# Patient Record
Sex: FEMALE | NOT HISPANIC OR LATINO | Employment: FULL TIME
[De-identification: names, ages, dates, MRNs, and addresses within clinical notes are randomized per-mention and may not be internally consistent; named-entity substitution may affect disease eponyms.]

---

## 2017-05-03 PROBLEM — E66.01 MORBID OBESITY DUE TO EXCESS CALORIES (H): Status: ACTIVE | Noted: 2017-05-03

## 2017-11-12 ENCOUNTER — HEALTH MAINTENANCE LETTER (OUTPATIENT)
Age: 42
End: 2017-11-12

## 2018-08-17 ENCOUNTER — DOCUMENTATION ONLY (OUTPATIENT)
Dept: LAB | Facility: CLINIC | Age: 43
End: 2018-08-17

## 2018-08-17 DIAGNOSIS — Z13.29 SCREENING FOR THYROID DISORDER: ICD-10-CM

## 2018-08-17 DIAGNOSIS — Z13.1 SCREENING FOR DIABETES MELLITUS (DM): Primary | ICD-10-CM

## 2018-08-17 DIAGNOSIS — Z13.6 CARDIOVASCULAR SCREENING; LDL GOAL LESS THAN 160: ICD-10-CM

## 2018-08-17 NOTE — PROGRESS NOTES
Labs pended. Routed to PCP to review and order.    Sweetie Plaza RN,   [facility]

## 2018-08-21 ENCOUNTER — OFFICE VISIT (OUTPATIENT)
Dept: PEDIATRICS | Facility: CLINIC | Age: 43
End: 2018-08-21
Payer: COMMERCIAL

## 2018-08-21 ENCOUNTER — ALLIED HEALTH/NURSE VISIT (OUTPATIENT)
Dept: CARDIOLOGY | Facility: CLINIC | Age: 43
End: 2018-08-21
Attending: INTERNAL MEDICINE
Payer: COMMERCIAL

## 2018-08-21 VITALS
HEART RATE: 73 BPM | DIASTOLIC BLOOD PRESSURE: 74 MMHG | TEMPERATURE: 97 F | SYSTOLIC BLOOD PRESSURE: 110 MMHG | BODY MASS INDEX: 30.24 KG/M2 | OXYGEN SATURATION: 100 % | HEIGHT: 59 IN | WEIGHT: 150 LBS

## 2018-08-21 DIAGNOSIS — R00.2 PALPITATIONS: ICD-10-CM

## 2018-08-21 DIAGNOSIS — Z13.29 SCREENING FOR THYROID DISORDER: ICD-10-CM

## 2018-08-21 DIAGNOSIS — Z13.1 SCREENING FOR DIABETES MELLITUS (DM): ICD-10-CM

## 2018-08-21 DIAGNOSIS — Z13.6 CARDIOVASCULAR SCREENING; LDL GOAL LESS THAN 160: ICD-10-CM

## 2018-08-21 DIAGNOSIS — Z12.4 SCREENING FOR MALIGNANT NEOPLASM OF CERVIX: ICD-10-CM

## 2018-08-21 DIAGNOSIS — Z90.49 S/P CHOLECYSTECTOMY: ICD-10-CM

## 2018-08-21 DIAGNOSIS — R73.03 PREDIABETES: ICD-10-CM

## 2018-08-21 DIAGNOSIS — R79.89 ABNORMAL TSH: ICD-10-CM

## 2018-08-21 DIAGNOSIS — E66.09 CLASS 1 OBESITY DUE TO EXCESS CALORIES WITHOUT SERIOUS COMORBIDITY WITH BODY MASS INDEX (BMI) OF 30.0 TO 30.9 IN ADULT: ICD-10-CM

## 2018-08-21 DIAGNOSIS — B35.1 FUNGAL TOENAIL INFECTION: ICD-10-CM

## 2018-08-21 DIAGNOSIS — Z00.00 ROUTINE GENERAL MEDICAL EXAMINATION AT A HEALTH CARE FACILITY: Primary | ICD-10-CM

## 2018-08-21 DIAGNOSIS — E66.811 CLASS 1 OBESITY DUE TO EXCESS CALORIES WITHOUT SERIOUS COMORBIDITY WITH BODY MASS INDEX (BMI) OF 30.0 TO 30.9 IN ADULT: ICD-10-CM

## 2018-08-21 LAB
ALBUMIN SERPL-MCNC: 3.8 G/DL (ref 3.4–5)
ALP SERPL-CCNC: 83 U/L (ref 40–150)
ALT SERPL W P-5'-P-CCNC: 20 U/L (ref 0–50)
ANION GAP SERPL CALCULATED.3IONS-SCNC: 7 MMOL/L (ref 3–14)
AST SERPL W P-5'-P-CCNC: 8 U/L (ref 0–45)
BILIRUB DIRECT SERPL-MCNC: <0.1 MG/DL (ref 0–0.2)
BILIRUB SERPL-MCNC: 0.4 MG/DL (ref 0.2–1.3)
BUN SERPL-MCNC: 16 MG/DL (ref 7–30)
CALCIUM SERPL-MCNC: 8.6 MG/DL (ref 8.5–10.1)
CHLORIDE SERPL-SCNC: 107 MMOL/L (ref 94–109)
CHOLEST SERPL-MCNC: 161 MG/DL
CO2 SERPL-SCNC: 27 MMOL/L (ref 20–32)
CREAT SERPL-MCNC: 0.91 MG/DL (ref 0.52–1.04)
GFR SERPL CREATININE-BSD FRML MDRD: 67 ML/MIN/1.7M2
GLUCOSE SERPL-MCNC: 103 MG/DL (ref 70–99)
HBA1C MFR BLD: 5.7 % (ref 0–5.6)
HDLC SERPL-MCNC: 39 MG/DL
LDLC SERPL CALC-MCNC: 98 MG/DL
NONHDLC SERPL-MCNC: 122 MG/DL
POTASSIUM SERPL-SCNC: 4.2 MMOL/L (ref 3.4–5.3)
PROT SERPL-MCNC: 7.5 G/DL (ref 6.8–8.8)
SODIUM SERPL-SCNC: 141 MMOL/L (ref 133–144)
T4 FREE SERPL-MCNC: 1 NG/DL (ref 0.76–1.46)
TRIGL SERPL-MCNC: 119 MG/DL
TSH SERPL DL<=0.005 MIU/L-ACNC: 4.8 MU/L (ref 0.4–4)

## 2018-08-21 PROCEDURE — 87624 HPV HI-RISK TYP POOLED RSLT: CPT | Performed by: INTERNAL MEDICINE

## 2018-08-21 PROCEDURE — 84443 ASSAY THYROID STIM HORMONE: CPT | Performed by: FAMILY MEDICINE

## 2018-08-21 PROCEDURE — 83036 HEMOGLOBIN GLYCOSYLATED A1C: CPT | Performed by: FAMILY MEDICINE

## 2018-08-21 PROCEDURE — 0298T ZZC EXT ECG > 48HR TO 21 DAY REVIEW AND INTERPRETATN: CPT

## 2018-08-21 PROCEDURE — 84439 ASSAY OF FREE THYROXINE: CPT | Performed by: FAMILY MEDICINE

## 2018-08-21 PROCEDURE — 0296T ZIO PATCH HOLTER: CPT

## 2018-08-21 PROCEDURE — 36415 COLL VENOUS BLD VENIPUNCTURE: CPT | Performed by: INTERNAL MEDICINE

## 2018-08-21 PROCEDURE — 80061 LIPID PANEL: CPT | Performed by: FAMILY MEDICINE

## 2018-08-21 PROCEDURE — G0145 SCR C/V CYTO,THINLAYER,RESCR: HCPCS | Performed by: INTERNAL MEDICINE

## 2018-08-21 PROCEDURE — 99214 OFFICE O/P EST MOD 30 MIN: CPT | Mod: 25 | Performed by: INTERNAL MEDICINE

## 2018-08-21 PROCEDURE — 80048 BASIC METABOLIC PNL TOTAL CA: CPT | Performed by: FAMILY MEDICINE

## 2018-08-21 PROCEDURE — 93000 ELECTROCARDIOGRAM COMPLETE: CPT | Performed by: INTERNAL MEDICINE

## 2018-08-21 PROCEDURE — 80076 HEPATIC FUNCTION PANEL: CPT | Performed by: INTERNAL MEDICINE

## 2018-08-21 PROCEDURE — 99396 PREV VISIT EST AGE 40-64: CPT | Performed by: INTERNAL MEDICINE

## 2018-08-21 RX ORDER — TERBINAFINE HYDROCHLORIDE 250 MG/1
250 TABLET ORAL DAILY
Qty: 90 TABLET | Refills: 0 | Status: SHIPPED | OUTPATIENT
Start: 2018-08-21 | End: 2019-05-23

## 2018-08-21 NOTE — PROGRESS NOTES
Dear Yael,   Here are your recent results that we reviewed at your recent clinic visit. Please continue care plan during the visit and follow up as planned.     Please call or Mychart if you have further questions.     Diego Rouse MD-PhD

## 2018-08-21 NOTE — PROGRESS NOTES
SUBJECTIVE:   CC: Yael Morales is an 42 year old woman who presents for preventive health visit.     Healthy Habits:    Do you get at least three servings of calcium containing foods daily (dairy, green leafy vegetables, etc.)? no    Amount of exercise or daily activities, outside of work: 0 day(s) per week    Problems taking medications regularly No    Medication side effects: No    Have you had an eye exam in the past two years? yes    Do you see a dentist twice per year? yes    Do you have sleep apnea, excessive snoring or daytime drowsiness?yes      PROBLEMS TO ADD ON...   1. Upper abdominal discomfort, unrelated to eating. Happened 3 times. One time it was while sitting at work. Two other times at home. Didn't remember what she was doing. It is a 1/10. Not affecting activities but aware. No constipation or diarrhea. She had cholecystectomy 2 years ago. The surgeon told her she has an umbilical hernia.     Wt Readings from Last 5 Encounters:   08/21/18 150 lb (68 kg)   11/16/16 154 lb 3.2 oz (69.9 kg)   05/13/16 149 lb 4.8 oz (67.7 kg)   12/15/15 145 lb 4.5 oz (65.9 kg)   11/27/15 141 lb (64 kg)     2. Palpitations past 3 weeks. Her BP has been normal. But she feels flutters in the chest. Has never felt this before. Denies anxiety. Last a few seconds. Takes a big breath, relax and it goes away.     3.  Reports fungal toenail and desired treatment.  2 toenails also has darker coloration compared to the rest.  Denies trauma.  There is no pain or itching.  She does not do pedicure.    Today's PHQ-2 Score:   PHQ-2 ( 1999 [employer]) 8/21/2018 5/13/2016   Q1: Little interest or pleasure in doing things 0 0   Q2: Feeling down, depressed or hopeless 0 0   PHQ-2 Score 0 0       Abuse: Current or Past(Physical, Sexual or Emotional)- No  Do you feel safe in your environment - Yes    Social History   Substance Use Topics     Smoking status: Never Smoker     Smokeless tobacco: Never Used     Alcohol use No     If you drink  "alcohol do you typically have >3 drinks per day or >7 drinks per week? No                     Reviewed orders with patient.  Reviewed health maintenance and updated orders accordingly - Yes  Labs reviewed in EPIC    Mammogram not appropriate for this patient based on age.    Pertinent mammograms are reviewed under the imaging tab.  History of abnormal Pap smear: NO - age 30-65 PAP every 5 years with negative HPV co-testing recommended  PAP / HPV 9/12/2014 1/31/2014 7/11/2011   PAP NIL NIL NIL     Reviewed and updated as needed this visit by clinical staff  Tobacco  Allergies  Meds  Med Hx  Surg Hx  Fam Hx  Soc Hx        Reviewed and updated as needed this visit by Provider            ROS:  CONSTITUTIONAL: NEGATIVE for fever, chills, change in weight  INTEGUMENTARU/SKIN: NEGATIVE for worrisome rashes, moles or lesions  EYES: NEGATIVE for vision changes or irritation  ENT: NEGATIVE for ear, mouth and throat problems  RESP: NEGATIVE for significant cough or SOB  BREAST: NEGATIVE for masses, tenderness or discharge  CV: NEGATIVE for chest pain, palpitations or peripheral edema  GI: NEGATIVE for nausea, abdominal pain, heartburn, or change in bowel habits  : NEGATIVE for unusual urinary or vaginal symptoms. Periods are regular.  MUSCULOSKELETAL: NEGATIVE for significant arthralgias or myalgia  NEURO: NEGATIVE for weakness, dizziness or paresthesias  PSYCHIATRIC: NEGATIVE for changes in mood or affect    OBJECTIVE:   /74  Pulse 73  Temp 97  F (36.1  C) (Temporal)  Ht 4' 11.25\" (1.505 m)  Wt 150 lb (68 kg)  SpO2 100%  BMI 30.04 kg/m2  EXAM:  GENERAL: healthy, alert and no distress  EYES: Eyes grossly normal to inspection, PERRL and conjunctivae and sclerae normal  HENT: ear canals and TM's normal, nose and mouth without ulcers or lesions  NECK: no adenopathy, no asymmetry, masses, or scars and thyroid normal to palpation  RESP: lungs clear to auscultation - no rales, rhonchi or wheezes  BREAST: normal " without masses, tenderness or nipple discharge and no palpable axillary masses or adenopathy  CV: regular rate and rhythm, normal S1 S2, no S3 or S4, no murmur, click or rub, no peripheral edema and peripheral pulses strong  ABDOMEN: soft, nontender, no hepatosplenomegaly, no masses and bowel sounds normal  MS: no gross musculoskeletal defects noted, no edema  SKIN: no suspicious lesions or rashes  NEURO: Normal strength and tone, mentation intact and speech normal  PSYCH: mentation appears normal, affect normal/bright    Foot exam: Big toenail with yellowish discoloration and thickening in the distal third of the nail on both feet.  The third toe of both feet, the nail is slightly purplish in color.  At the base of the nailbed there is a reading of Dr. discoloration.  There is no tenderness or swelling or erythema.    Diagnostic Test Results:  Results for orders placed or performed in visit on 08/21/18 (from the past 24 hour(s))   Hepatic panel (Albumin, ALT, AST, Bili, Alk Phos, TP)   Result Value Ref Range    Bilirubin Direct <0.1 0.0 - 0.2 mg/dL    Bilirubin Total 0.4 0.2 - 1.3 mg/dL    Albumin 3.8 3.4 - 5.0 g/dL    Protein Total 7.5 6.8 - 8.8 g/dL    Alkaline Phosphatase 83 40 - 150 U/L    ALT 20 0 - 50 U/L    AST 8 0 - 45 U/L       Orders Only on 08/21/2018   Component Date Value Ref Range Status     Hemoglobin A1C 08/21/2018 5.7* 0 - 5.6 % Final    Comment: Normal <5.7% Prediabetes 5.7-6.4%  Diabetes 6.5% or higher - adopted from ADA   consensus guidelines.       Cholesterol 08/21/2018 161  <200 mg/dL Final     Triglycerides 08/21/2018 119  <150 mg/dL Final    Fasting specimen     HDL Cholesterol 08/21/2018 39* >49 mg/dL Final     LDL Cholesterol Calculated 08/21/2018 98  <100 mg/dL Final    Desirable:       <100 mg/dl     Non HDL Cholesterol 08/21/2018 122  <130 mg/dL Final     Sodium 08/21/2018 141  133 - 144 mmol/L Final     Potassium 08/21/2018 4.2  3.4 - 5.3 mmol/L Final     Chloride 08/21/2018 107  94 -  109 mmol/L Final     Carbon Dioxide 08/21/2018 27  20 - 32 mmol/L Final     Anion Gap 08/21/2018 7  3 - 14 mmol/L Final     Glucose 08/21/2018 103* 70 - 99 mg/dL Final    Fasting specimen     Urea Nitrogen 08/21/2018 16  7 - 30 mg/dL Final     Creatinine 08/21/2018 0.91  0.52 - 1.04 mg/dL Final     GFR Estimate 08/21/2018 67  >60 mL/min/1.7m2 Final    Non  GFR Calc     GFR Estimate If Black 08/21/2018 81  >60 mL/min/1.7m2 Final    African American GFR Calc     Calcium 08/21/2018 8.6  8.5 - 10.1 mg/dL Final     TSH 08/21/2018 4.80* 0.40 - 4.00 mU/L Final     T4 Free 08/21/2018 1.00  0.76 - 1.46 ng/dL Final        EKG: Sinus rhythm, no PVC or PAC.    ASSESSMENT/PLAN:       ICD-10-CM    1. Routine general medical examination at a health care facility Z00.00    2. Palpitations R00.2 EKG 12-lead complete w/read - Clinics     Zio Patch Holter   3. S/P cholecystectomy Z90.49    4. Screening for thyroid disorder Z13.29    5. Screening for malignant neoplasm of cervix Z12.4 Pap imaged thin layer screen with HPV - recommended age 30 - 65 years (select HPV order below)     HPV High Risk Types DNA Cervical   6. Abnormal TSH R94.6 TSH     T4, free     T3, Free     Thyroid peroxidase antibody   7. Prediabetes R73.03    8. Fungal toenail infection B35.1 Hepatic panel (Albumin, ALT, AST, Bili, Alk Phos, TP)     terbinafine (LAMISIL) 250 MG tablet     Hepatic panel (Albumin, ALT, AST, Bili, Alk Phos, TP)   9. Class 1 obesity due to excess calories without serious comorbidity with body mass index (BMI) of 30.0 to 30.9 in adult E66.09     Z68.30        --Reported history of umbilical hernia/mild abdominal discomfort: This is not reproducible on exam.  I advised the patient to correlate the pain in the location of the pain when he comes next.  Consider follow-up with her general surgeon regarding the diagnosis umbilical hernia.  --Abnormal TSH: Very borderline finding.  I recommended recheck in a month with  "thyroperoxidase antibody.  --Fungal toenail in the big toes: Baseline hepatic function is normal.  We will start with Lamisil.  Patient will recheck liver function tests with the thyroid tests in a month.  Complete total treatment for 3 months.  I discussed with the patient that there is about 60% chance of relapse.  If this does not help with the dystrophic nail, consider seeing podiatry for evaluation.  --Prediabetes/low HDL: Discussed diet exercise and weight loss.  --Palpitation: ECG did not have evidence of PVC or PAC.  Will have patient do a Zio patch for evaluation.    COUNSELING:   Reviewed preventive health counseling, as reflected in patient instructions    BP Readings from Last 1 Encounters:   08/21/18 110/74     Estimated body mass index is 30.04 kg/(m^2) as calculated from the following:    Height as of this encounter: 4' 11.25\" (1.505 m).    Weight as of this encounter: 150 lb (68 kg).      Weight management plan: Encouraged patient to work on diet and weight loss.     reports that she has never smoked. She has never used smokeless tobacco.      Counseling Resources:  ATP IV Guidelines  Pooled Cohorts Equation Calculator  Breast Cancer Risk Calculator  FRAX Risk Assessment  ICSI Preventive Guidelines  Dietary Guidelines for Americans, 2010  USDA's MyPlate  ASA Prophylaxis  Lung CA Screening    Diego Rouse MD PhD  [facility]  "

## 2018-08-21 NOTE — LETTER
September 21, 2018      Yael Morales  [address] [address]        Dear Yael,     Your recent lab results showed the following:  -- Holter monitor result didn't show any arrhythmia. During the time you had the monitor, no symptoms were reported. So we cannot say for certain what the palpitations are.   -- The good news is that your heart rhythm throughout the course is normal. If the palpitations becomes more frequent in the future, such as daily, we can consider re-do the monitor again.     Please call or Mychart to our office if you have further questions.     Diego Rouse MD-PhD

## 2018-08-21 NOTE — PROGRESS NOTES
Patient has been prescribed a ZioPatch holter for 14 days.  Patient was instructed regarding the indication, function, care and prompt return of the ZioPatch holter monitor. The monitor, with S/N [id number],  was placed on the patient with instructions regarding care of the skin, electrodes, and monitor, as well as documentation in the patient diary. Patient demonstrated understanding of this information and [facility] further questions or [facility].

## 2018-08-21 NOTE — PROGRESS NOTES
Dear Yael,   Here are your recent results which are within the expected range.  Please continue with your current plan of care.     Please call or Mychart to our office if you have further questions.     Diego Rouse MD-PhD

## 2018-08-21 NOTE — MR AVS SNAPSHOT
After Visit Summary   8/21/2018    Yael Morales    MRN: [id number]           Patient Information     Date Of Birth          1975        Visit Information        Provider Department      8/21/2018 8:30 AM Diego Rouse MD PhD [facility]        Today's Diagnoses     Routine general medical examination at a health care facility    -  1    Palpitations        S/P cholecystectomy        Screening for thyroid disorder        Screening for malignant neoplasm of cervix        Abnormal TSH        Prediabetes        Fungal toenail infection          Care Instructions    Make appointment(s) for:   -- Zio patch   -- non fasting lab in 1 month       Medication(s) prescribed today:    Orders Placed This Encounter   Medications     terbinafine (LAMISIL) 250 MG tablet     Sig: Take 1 tablet (250 mg) by mouth daily     Dispense:  90 tablet     Refill:  0             Preventive Health Recommendations  Female Ages 40 to 49    Yearly exam:     See your health care provider every year in order to  1. Review health changes.   2. Discuss preventive care.    3. Review your medicines if your doctor prescribed any.      Get a Pap test every three years (unless you have an abnormal result and your provider advises testing more often).      If you get Pap tests with HPV test, you only need to test every 5 years, unless you have an abnormal result. You do not need a Pap test if your uterus was removed (hysterectomy) and you have not had cancer.      You should be tested each year for STDs (sexually transmitted diseases), if you're at risk.     Ask your doctor if you should have a mammogram.      Have a colonoscopy (test for colon cancer) if someone in your family has had colon cancer or polyps before age 50.       Have a cholesterol test every 5 years.       Have a diabetes test (fasting glucose) after age 45. If you are at risk for diabetes, you should have this test every 3 years.    Shots: Get a flu shot  each year. Get a tetanus shot every 10 years.     Nutrition:     Eat at least 5 servings of fruits and vegetables each day.    Eat whole-grain bread, whole-wheat pasta and brown rice instead of white grains and rice.    Get adequate Calcium and Vitamin D.      Lifestyle    Exercise at least 150 minutes a week (an average of 30 minutes a day, 5 days a week). This will help you control your weight and prevent disease.    Limit alcohol to one drink per day.    No smoking.     Wear sunscreen to prevent skin cancer.    See your dentist every six months for an exam and cleaning.          Follow-ups after your visit        Future tests that were ordered for you today     Open Future Orders        Priority Expected Expires Ordered    Hepatic panel (Albumin, ALT, AST, Bili, Alk Phos, TP) Routine  8/21/2019 8/21/2018    Zio Patch Holter Routine  10/5/2018 8/21/2018    TSH Routine  10/31/2018 8/21/2018    T4, free Routine  10/31/2018 8/21/2018    T3, Free Routine  10/31/2018 8/21/2018    Thyroid peroxidase antibody Routine  10/31/2018 8/21/2018            Who to contact     If you have questions or need follow up information about today's clinic visit or your schedule please contact [facility] directly at [phone].  Normal or non-critical lab and imaging results will be communicated to you by Vyyknhart, letter or phone within 4 business days after the clinic has received the results. If you do not hear from us within 7 days, please contact the clinic through Vyyknhart or phone. If you have a critical or abnormal lab result, we will notify you by phone as soon as possible.  Submit refill requests through Secure-NOK or call your pharmacy and they will forward the refill request to us. Please allow 3 business days for your refill to be completed.          Additional Information About Your Visit        VyyknharFetchnotes Information     Secure-NOK gives you secure access to your electronic health record. If you see a primary care  "provider, you can also send messages to your care team and make appointments. If you have questions, please call your primary care clinic.  If you do not have a primary care provider, please call [phone] and they will assist you.      AFINOS is an electronic gateway that provides easy, online access to your medical records. With AFINOS, you can request a clinic appointment, read your test results, renew a prescription or communicate with your care team.     To access your existing account, please contact your [facility] or call [phone] for assistance.        Care EveryWhere ID     This is your Care EveryWhere ID. This could be used by other organizations to access your [place] medical records  [id number]        Your Vitals Were     Pulse Temperature Height Pulse Oximetry BMI (Body Mass Index)       73 97  F (36.1  C) (Temporal) 4' 11.25\" (1.505 m) 100% 30.04 kg/m2        Blood Pressure from Last 3 Encounters:   08/21/18 110/74   11/16/16 132/70   05/13/16 102/70    Weight from Last 3 Encounters:   08/21/18 150 lb (68 kg)   11/16/16 154 lb 3.2 oz (69.9 kg)   05/13/16 149 lb 4.8 oz (67.7 kg)              We Performed the Following     EKG 12-lead complete w/read - Clinics     Hepatic panel (Albumin, ALT, AST, Bili, Alk Phos, TP)     HPV High Risk Types DNA Cervical     Pap imaged thin layer screen with HPV - recommended age 30 - 65 years (select HPV order below)          Today's Medication Changes          These changes are accurate as of 8/21/18  9:29 AM.  If you have any questions, ask your nurse or doctor.               Start taking these medicines.        Dose/Directions    terbinafine 250 MG tablet   Commonly known as:  lamISIL   Used for:  Fungal toenail infection   Started by:  Diego Rouse MD PhD        Dose:  250 mg   Take 1 tablet (250 mg) by mouth daily   Quantity:  90 tablet   Refills:  0            Where to get your medicines      These medications were sent to " [id number] - [place] - [address] [facility]  [address] [address]     Phone:  [phone]     terbinafine 250 MG tablet                Primary Care Provider Office Phone # Fax #    Diego Rouse MD PhD [phone]       [address]  [facility] [address]        Equal Access to Services     MIRTHA DUNCAN : Hadii aad ku hadasho Soomaali, waaxda luqadaha, qaybta kaalmada adeegyada, waxay idiin hayaan adeeg kharash la'josé luis . So [facility] [phone].    ATENCIÓN: Si habla español, tiene a posadas disposición servicios gratuitos de asistencia lingüística. Fe al [phone].    We comply with applicable federal civil rights laws and [place] laws. We do not discriminate on the basis of race, color, national origin, age, disability, sex, sexual orientation, or gender identity.            Thank you!     Thank you for choosing [facility]  for your care. Our goal is always to provide you with excellent care. Hearing back from our patients is one way we can continue to improve our services. Please take a few minutes to complete the written survey that you may receive in the mail after your visit with us. Thank you!             Your Updated Medication List - Protect others around you: Learn how to safely use, store and throw away your medicines at www.disposemymeds.org.          This list is accurate as of 8/21/18  9:29 AM.  Always use your most recent med list.                   Brand Name Dispense Instructions for use Diagnosis    terbinafine 250 MG tablet    lamISIL    90 tablet    Take 1 tablet (250 mg) by mouth daily    Fungal toenail infection

## 2018-08-21 NOTE — PATIENT INSTRUCTIONS
Make appointment(s) for:   -- Zio patch   -- non fasting lab in 1 month       Medication(s) prescribed today:    Orders Placed This Encounter   Medications     terbinafine (LAMISIL) 250 MG tablet     Sig: Take 1 tablet (250 mg) by mouth daily     Dispense:  90 tablet     Refill:  0             Preventive Health Recommendations  Female Ages 40 to 49    Yearly exam:     See your health care provider every year in order to  1. Review health changes.   2. Discuss preventive care.    3. Review your medicines if your doctor prescribed any.      Get a Pap test every three years (unless you have an abnormal result and your provider advises testing more often).      If you get Pap tests with HPV test, you only need to test every 5 years, unless you have an abnormal result. You do not need a Pap test if your uterus was removed (hysterectomy) and you have not had cancer.      You should be tested each year for STDs (sexually transmitted diseases), if you're at risk.     Ask your doctor if you should have a mammogram.      Have a colonoscopy (test for colon cancer) if someone in your family has had colon cancer or polyps before age 50.       Have a cholesterol test every 5 years.       Have a diabetes test (fasting glucose) after age 45. If you are at risk for diabetes, you should have this test every 3 years.    Shots: Get a flu shot each year. Get a tetanus shot every 10 years.     Nutrition:     Eat at least 5 servings of fruits and vegetables each day.    Eat whole-grain bread, whole-wheat pasta and brown rice instead of white grains and rice.    Get adequate Calcium and Vitamin D.      Lifestyle    Exercise at least 150 minutes a week (an average of 30 minutes a day, 5 days a week). This will help you control your weight and prevent disease.    Limit alcohol to one drink per day.    No smoking.     Wear sunscreen to prevent skin cancer.    See your dentist every six months for an exam and cleaning.

## 2018-08-21 NOTE — MR AVS SNAPSHOT
MRN:[id number]                      After Visit Summary   8/21/2018    Yael Morales    MRN: [id number]           Visit Information        Provider Department      8/21/2018 9:45 AM MG CV TECH; MG DEVICE [facility]        Your next 10 appointments already scheduled     Sep 21, 2018 10:20 AM CDT   LAB with LAB FIRST FLOOR [facility]    [address] [place] [id number] [phone]           Please do not eat 10-12 hours before your appointment if you are coming in fasting for labs on lipids, cholesterol, or glucose (sugar). This does not apply to pregnant women. Water, hot tea and black coffee (with nothing added) are okay. Do not drink other fluids, diet soda or chew gum.              CogniFit Information     CogniFit gives you secure access to your electronic health record. If you see a primary care provider, you can also send messages to your care team and make appointments. If you have questions, please call your primary care clinic.  If you do not have a primary care provider, please call [phone] and they will assist you.      CogniFit is an electronic gateway that provides easy, online access to your medical records. With CogniFit, you can request a clinic appointment, read your test results, renew a prescription or communicate with your care team.     To access your existing account, please contact your [facility] or call [phone] for assistance.        Care EveryWhere ID     This is your Care EveryWhere ID. This could be used by other organizations to access your [place] medical records  [id number]        Equal Access to Services     SARA DUNCAN : Hadwale macdonald Somiriam, waaxda luqadaha, qaybta kaalmada zac connors idijose pena. So [facility] [phone].    ATENCIÓN: Si habla español, tiene a posadas disposición servicios gratuitos de  asistencia lingüística. Fe al [phone].    We comply with applicable federal civil rights laws and [place] laws. We do not discriminate on the basis of race, color, national origin, age, disability, sex, sexual orientation, or gender identity.

## 2018-08-24 LAB
COPATH REPORT: NORMAL
PAP: NORMAL

## 2018-08-28 LAB
FINAL DIAGNOSIS: NORMAL
HPV HR 12 DNA CVX QL NAA+PROBE: NEGATIVE
HPV16 DNA SPEC QL NAA+PROBE: NEGATIVE
HPV18 DNA SPEC QL NAA+PROBE: NEGATIVE
SPECIMEN DESCRIPTION: NORMAL
SPECIMEN SOURCE CVX/VAG CYTO: NORMAL

## 2018-11-19 DIAGNOSIS — R79.89 ABNORMAL TSH: ICD-10-CM

## 2018-11-19 DIAGNOSIS — B35.1 FUNGAL TOENAIL INFECTION: ICD-10-CM

## 2018-11-19 LAB
ALBUMIN SERPL-MCNC: 3.6 G/DL (ref 3.4–5)
ALP SERPL-CCNC: 72 U/L (ref 40–150)
ALT SERPL W P-5'-P-CCNC: 18 U/L (ref 0–50)
AST SERPL W P-5'-P-CCNC: 9 U/L (ref 0–45)
BILIRUB DIRECT SERPL-MCNC: 0.1 MG/DL (ref 0–0.2)
BILIRUB SERPL-MCNC: 0.5 MG/DL (ref 0.2–1.3)
PROT SERPL-MCNC: 6.8 G/DL (ref 6.8–8.8)
T3FREE SERPL-MCNC: 2.1 PG/ML (ref 2.3–4.2)
T4 FREE SERPL-MCNC: 0.93 NG/DL (ref 0.76–1.46)
TSH SERPL DL<=0.005 MIU/L-ACNC: 1.8 MU/L (ref 0.4–4)

## 2018-11-19 PROCEDURE — 84443 ASSAY THYROID STIM HORMONE: CPT | Performed by: INTERNAL MEDICINE

## 2018-11-19 PROCEDURE — 84439 ASSAY OF FREE THYROXINE: CPT | Performed by: INTERNAL MEDICINE

## 2018-11-19 PROCEDURE — 36415 COLL VENOUS BLD VENIPUNCTURE: CPT | Performed by: INTERNAL MEDICINE

## 2018-11-19 PROCEDURE — 80076 HEPATIC FUNCTION PANEL: CPT | Performed by: INTERNAL MEDICINE

## 2018-11-19 PROCEDURE — 86376 MICROSOMAL ANTIBODY EACH: CPT | Performed by: INTERNAL MEDICINE

## 2018-11-19 PROCEDURE — 84481 FREE ASSAY (FT-3): CPT | Performed by: INTERNAL MEDICINE

## 2018-11-20 LAB — THYROPEROXIDASE AB SERPL-ACNC: <10 IU/ML

## 2018-12-07 NOTE — PROGRESS NOTES
Dear Yael,   Your recent lab results showed the following:  -- your labs were all normal except for slightly low free T3. This usually reflects missing doses. Thyroid hormone replacement level is good based on normal TSH.  -- no change in your current medications.     Please call or Mychart to our office if you have further questions.     Diego Rouse MD-PhD

## 2019-05-23 ENCOUNTER — OFFICE VISIT (OUTPATIENT)
Dept: PEDIATRICS | Facility: CLINIC | Age: 44
End: 2019-05-23
Payer: COMMERCIAL

## 2019-05-23 VITALS
BODY MASS INDEX: 28.32 KG/M2 | SYSTOLIC BLOOD PRESSURE: 110 MMHG | WEIGHT: 140.5 LBS | HEIGHT: 59 IN | OXYGEN SATURATION: 100 % | TEMPERATURE: 98.5 F | HEART RATE: 68 BPM | DIASTOLIC BLOOD PRESSURE: 74 MMHG

## 2019-05-23 DIAGNOSIS — N30.01 ACUTE CYSTITIS WITH HEMATURIA: Primary | ICD-10-CM

## 2019-05-23 DIAGNOSIS — N91.2 ABSENCE OF MENSTRUATION: ICD-10-CM

## 2019-05-23 LAB
ALBUMIN UR-MCNC: 30 MG/DL
APPEARANCE UR: ABNORMAL
BILIRUB UR QL STRIP: NEGATIVE
COLOR UR AUTO: YELLOW
GLUCOSE UR STRIP-MCNC: NEGATIVE MG/DL
HCG UR QL: NEGATIVE
HGB UR QL STRIP: ABNORMAL
KETONES UR STRIP-MCNC: NEGATIVE MG/DL
LEUKOCYTE ESTERASE UR QL STRIP: ABNORMAL
NITRATE UR QL: NEGATIVE
PH UR STRIP: 8 PH (ref 5–7)
RBC #/AREA URNS AUTO: ABNORMAL /HPF
RENAL EPI CELLS #/AREA URNS HPF: ABNORMAL /HPF
SOURCE: ABNORMAL
SP GR UR STRIP: 1.01 (ref 1–1.03)
UROBILINOGEN UR STRIP-MCNC: NORMAL MG/DL (ref 0–2)
WBC #/AREA URNS AUTO: ABNORMAL /HPF
WBC CLUMPS #/AREA URNS HPF: PRESENT /HPF

## 2019-05-23 PROCEDURE — 87088 URINE BACTERIA CULTURE: CPT | Performed by: FAMILY MEDICINE

## 2019-05-23 PROCEDURE — 87086 URINE CULTURE/COLONY COUNT: CPT | Performed by: FAMILY MEDICINE

## 2019-05-23 PROCEDURE — 81001 URINALYSIS AUTO W/SCOPE: CPT | Performed by: FAMILY MEDICINE

## 2019-05-23 PROCEDURE — 81025 URINE PREGNANCY TEST: CPT | Performed by: FAMILY MEDICINE

## 2019-05-23 PROCEDURE — 87186 SC STD MICRODIL/AGAR DIL: CPT | Performed by: FAMILY MEDICINE

## 2019-05-23 PROCEDURE — 99213 OFFICE O/P EST LOW 20 MIN: CPT | Performed by: FAMILY MEDICINE

## 2019-05-23 RX ORDER — SULFAMETHOXAZOLE/TRIMETHOPRIM 800-160 MG
1 TABLET ORAL 2 TIMES DAILY
Qty: 10 TABLET | Refills: 0 | Status: SHIPPED | OUTPATIENT
Start: 2019-05-23 | End: 2019-05-28

## 2019-05-23 ASSESSMENT — PAIN SCALES - GENERAL: PAINLEVEL: MODERATE PAIN (4)

## 2019-05-23 ASSESSMENT — MIFFLIN-ST. JEOR: SCORE: 1196.89

## 2019-05-23 NOTE — PATIENT INSTRUCTIONS
"Patient Education     Bladder Infection, Female (Adult)    Urine is normally doesn't have any bacteria in it. But bacteria can get into the urinary tract from the skin around the rectum. Or they can travel in the blood from elsewhere in the body. Once they are in your urinary tract, they can cause infection in the urethra (urethritis), the bladder (cystitis), or the kidneys (pyelonephritis).  The most common place for an infection is in the bladder. This is called a bladder infection. This is one of the most common infections in women. Most bladder infections are easily treated. They are not serious unless the infection spreads to the kidney.  The phrases \"bladder infection,\" \"UTI,\" and \"cystitis\" are often used to describe the same thing. But they are not always the same. Cystitis is an inflammation of the bladder. The most common cause of cystitis is an infection.  Symptoms  The infection causes inflammation in the urethra and bladder. This causes many of the symptoms. The most common symptoms of a bladder infection are:    Pain or burning when urinating    Having to urinate more often than usual    Urgent need to urinate    Only a small amount of urine comes out    Blood in urine    Abdominal discomfort. This is usually in the lower abdomen above the pubic bone.    Cloudy urine    Strong- or bad-smelling urine    Unable to urinate (urinary retention)    Unable to hold urine in (urinary incontinence)    Fever    Loss of appetite    Confusion (in older adults)  Causes  Bladder infections are not contagious. You can't get one from someone else, from a toilet seat, or from sharing a bath.  The most common cause of bladder infections is bacteria from the bowels. The bacteria get onto the skin around the opening of the urethra. From there, they can get into the urine and travel up to the bladder, causing inflammation and infection. This usually happens because of:    Wiping improperly after urinating. Always wipe from " front to back.    Bowel incontinence    Pregnancy    Procedures such as having a catheter inserted    Older age    Not emptying your bladder. This can allow bacteria a chance to grow in your urine.    Dehydration    Constipation    Sex    Use of a diaphragm for birth control   Treatment  Bladder infections are diagnosed by a urine test. They are treated with antibiotics and usually clear up quickly without complications. Treatment helps prevent a more serious kidney infection.  Medicines  Medicines can help in the treatment of a bladder infection:    Take antibiotics until they are used up, even if you feel better. It is important to finish them to make sure the infection has cleared.    You can use acetaminophen or ibuprofen for pain, fever, or discomfort, unless another medicine was prescribed. If you have chronic liver or kidney disease, talk with your healthcare provider before using these medicines. Also talk with your provider if you've ever had a stomach ulcer or gastrointestinal bleeding, or are taking blood-thinner medicines.    If you are given phenazopydridine to reduce burning with urination, it will cause your urine to become a bright orange color. This can stain clothing.  Care and prevention  These self-care steps can help prevent future infections:    Drink plenty of fluids to prevent dehydration and flush out your bladder. Do this unless you must restrict fluids for other health reasons, or your doctor told you not to.    Proper cleaning after going to the bathroom is important. Wipe from front to back after using the toilet to prevent the spread of bacteria.    Urinate more often. Don't try to hold urine in for a long time.    Wear loose-fitting clothes and cotton underwear. Avoid tight-fitting pants.    Improve your diet and prevent constipation. Eat more fresh fruit and vegetables, and fiber, and less junk and fatty foods.    Avoid sex until your symptoms are gone.    Avoid caffeine, alcohol, and  spicy foods. These can irritate your bladder.    Urinate right after intercourse to flush out your bladder.    If you use birth control pills and have frequent bladder infections, discuss it with your doctor.  Follow-up care  Call your healthcare provider if all symptoms are not gone after 3 days of treatment. This is especially important if you have repeat infections.  If a culture was done, you will be told if your treatment needs to be changed. If directed, you can call to find out the results.  If X-rays were done, you will be told if the results will affect your treatment.  Call 911  Call 911 if any of the following occur:    Trouble breathing    Hard to wake up or confusion    Fainting or loss of consciousness    Rapid heart rate  When to seek medical advice  Call your healthcare provider right away if any of these occur:    Fever of 100.4 F (38.0 C) or higher, or as directed by your healthcare provider    Symptoms are not better by the third day of treatment    Back or belly (abdominal) pain that gets worse    Repeated vomiting, or unable to keep medicine down    Weakness or dizziness    Vaginal discharge    Pain, redness, or swelling in the outer vaginal area (labia)  Date Last Reviewed: 10/1/2016    4183-0354 [employer]. [address] [place] [address], [place] [address]. All rights reserved. This information is not intended as a substitute for professional medical care. Always follow your healthcare professional's instructions.

## 2019-05-23 NOTE — PROGRESS NOTES
"Subjective     Yael ALINE Morales is a 43 year old female who presents to clinic today for the following health issues:    HPI   URINARY TRACT SYMPTOMS      Duration: Monday    Description  dysuria, frequency, urgency and hematuria    Intensity:  moderate, severe    Accompanying signs and symptoms:  Fever/chills: Cold sweats this morning  Flank pain no   Nausea and vomiting: YES- nausea  Vaginal symptoms: none  Abdominal/Pelvic Pain: YES    History  History of frequent UTI's: no   History of kidney stones: no   Sexually Active: YES  Possibility of pregnancy: No    Precipitating or alleviating factors: None    Therapies tried and outcome: increase fluid intake           Reviewed and updated as needed this visit by Provider         Review of Systems   ROS COMP: Constitutional, HEENT, cardiovascular, pulmonary, gi and gu systems are negative, except as otherwise noted.      Objective    /74 (BP Location: Right arm, Patient Position: Sitting, Cuff Size: Adult Regular)   Pulse 68   Temp 98.5  F (36.9  C) (Oral)   Ht 1.505 m (4' 11.25\")   Wt 63.7 kg (140 lb 8 oz)   LMP 05/08/2019 (Exact Date)   SpO2 100%   BMI 28.14 kg/m    Body mass index is 28.14 kg/m .  Physical Exam   GENERAL: healthy, alert and no distress  RESP: lungs clear to auscultation - no rales, rhonchi or wheezes  CV: regular rate and rhythm, normal S1 S2, no S3 or S4, no murmur, click or rub, no peripheral edema and peripheral pulses strong  ABDOMEN: soft, nontender, no hepatosplenomegaly, no masses and bowel sounds normal            Assessment & Plan     1. Acute cystitis with hematuria  Push fluids, await urine culture.  - sulfamethoxazole-trimethoprim (BACTRIM DS/SEPTRA DS) 800-160 MG tablet; Take 1 tablet by mouth 2 times daily for 5 days  Dispense: 10 tablet; Refill: 0    2. Absence of menstruation  Reviewed negative pregnancy test with patient. Proceed with treatment for cystitis.  - Urine Culture Aerobic Bacterial  - HCG qualitative urine   "     Return if symptoms worsen or fail to improve.    Deisy Coyne MD  [facility]

## 2019-05-26 LAB
BACTERIA SPEC CULT: ABNORMAL
SPECIMEN SOURCE: ABNORMAL

## 2019-05-28 ENCOUNTER — TELEPHONE (OUTPATIENT)
Dept: PEDIATRICS | Facility: CLINIC | Age: 44
End: 2019-05-28

## 2019-05-28 DIAGNOSIS — N30.00 ACUTE CYSTITIS WITHOUT HEMATURIA: Primary | ICD-10-CM

## 2019-05-28 RX ORDER — CEFDINIR 300 MG/1
300 CAPSULE ORAL 2 TIMES DAILY
Qty: 14 CAPSULE | Refills: 0 | Status: SHIPPED | OUTPATIENT
Start: 2019-05-28 | End: 2019-06-04

## 2019-05-28 NOTE — TELEPHONE ENCOUNTER
I called to notify patient of urine culture and need to switch antibiotics. Cefdinir 300 mg twice daily for 7 days sent to the [facility] pharmacy at [facility].

## 2019-12-03 ENCOUNTER — OFFICE VISIT (OUTPATIENT)
Dept: PEDIATRICS | Facility: CLINIC | Age: 44
End: 2019-12-03
Payer: COMMERCIAL

## 2019-12-03 VITALS
HEART RATE: 95 BPM | WEIGHT: 142 LBS | HEIGHT: 59 IN | OXYGEN SATURATION: 99 % | TEMPERATURE: 97.2 F | DIASTOLIC BLOOD PRESSURE: 70 MMHG | SYSTOLIC BLOOD PRESSURE: 113 MMHG | BODY MASS INDEX: 28.63 KG/M2

## 2019-12-03 DIAGNOSIS — E78.5 HYPERLIPIDEMIA LDL GOAL <160: ICD-10-CM

## 2019-12-03 DIAGNOSIS — Z00.00 ROUTINE GENERAL MEDICAL EXAMINATION AT A HEALTH CARE FACILITY: Primary | ICD-10-CM

## 2019-12-03 DIAGNOSIS — Z23 FLU VACCINE NEED: ICD-10-CM

## 2019-12-03 DIAGNOSIS — E66.3 OVERWEIGHT (BMI 25.0-29.9): ICD-10-CM

## 2019-12-03 DIAGNOSIS — Z12.31 ENCOUNTER FOR SCREENING MAMMOGRAM FOR BREAST CANCER: ICD-10-CM

## 2019-12-03 PROBLEM — E66.01 MORBID OBESITY DUE TO EXCESS CALORIES (H): Status: RESOLVED | Noted: 2017-05-03 | Resolved: 2019-12-03

## 2019-12-03 LAB
ANION GAP SERPL CALCULATED.3IONS-SCNC: 3 MMOL/L (ref 3–14)
BUN SERPL-MCNC: 13 MG/DL (ref 7–30)
CALCIUM SERPL-MCNC: 8.7 MG/DL (ref 8.5–10.1)
CHLORIDE SERPL-SCNC: 110 MMOL/L (ref 94–109)
CHOLEST SERPL-MCNC: 179 MG/DL
CO2 SERPL-SCNC: 28 MMOL/L (ref 20–32)
CREAT SERPL-MCNC: 0.66 MG/DL (ref 0.52–1.04)
GFR SERPL CREATININE-BSD FRML MDRD: >90 ML/MIN/{1.73_M2}
GLUCOSE SERPL-MCNC: 95 MG/DL (ref 70–99)
HDLC SERPL-MCNC: 47 MG/DL
LDLC SERPL CALC-MCNC: 114 MG/DL
NONHDLC SERPL-MCNC: 132 MG/DL
POTASSIUM SERPL-SCNC: 4 MMOL/L (ref 3.4–5.3)
SODIUM SERPL-SCNC: 141 MMOL/L (ref 133–144)
TRIGL SERPL-MCNC: 89 MG/DL

## 2019-12-03 PROCEDURE — 80061 LIPID PANEL: CPT | Performed by: INTERNAL MEDICINE

## 2019-12-03 PROCEDURE — 99396 PREV VISIT EST AGE 40-64: CPT | Performed by: INTERNAL MEDICINE

## 2019-12-03 PROCEDURE — 80048 BASIC METABOLIC PNL TOTAL CA: CPT | Performed by: INTERNAL MEDICINE

## 2019-12-03 PROCEDURE — 36415 COLL VENOUS BLD VENIPUNCTURE: CPT | Performed by: INTERNAL MEDICINE

## 2019-12-03 ASSESSMENT — MIFFLIN-ST. JEOR: SCORE: 1195.77

## 2019-12-03 NOTE — RESULT ENCOUNTER NOTE
Results discussed directly with patient while patient was present. Any further details documented in the note.   Diego Rouse MD PhD

## 2019-12-03 NOTE — PATIENT INSTRUCTIONS
Make appointment(s) for:   -- mammogram.           Preventive Health Recommendations  Female Ages 40 to 49    Yearly exam:     See your health care provider every year in order to  1. Review health changes.   2. Discuss preventive care.    3. Review your medicines if your doctor prescribed any.      Get a Pap test every three years (unless you have an abnormal result and your provider advises testing more often).      If you get Pap tests with HPV test, you only need to test every 5 years, unless you have an abnormal result. You do not need a Pap test if your uterus was removed (hysterectomy) and you have not had cancer.      You should be tested each year for STDs (sexually transmitted diseases), if you're at risk.     Ask your doctor if you should have a mammogram.      Have a colonoscopy (test for colon cancer) if someone in your family has had colon cancer or polyps before age 50.       Have a cholesterol test every 5 years.       Have a diabetes test (fasting glucose) after age 45. If you are at risk for diabetes, you should have this test every 3 years.    Shots: Get a flu shot each year. Get a tetanus shot every 10 years.     Nutrition:     Eat at least 5 servings of fruits and vegetables each day.    Eat whole-grain bread, whole-wheat pasta and brown rice instead of white grains and rice.    Get adequate Calcium and Vitamin D.      Lifestyle    Exercise at least 150 minutes a week (an average of 30 minutes a day, 5 days a week). This will help you control your weight and prevent disease.    Limit alcohol to one drink per day.    No smoking.     Wear sunscreen to prevent skin cancer.    See your dentist every six months for an exam and cleaning.  Preventive Health Recommendations  Female Ages 40 to 49    Yearly exam:   See your health care provider every year in order to  Review health changes.   Discuss preventive care.    Review your medicines if your doctor prescribed any.    Get a Pap test every three  years (unless you have an abnormal result and your provider advises testing more often).    If you get Pap tests with HPV test, you only need to test every 5 years, unless you have an abnormal result. You do not need a Pap test if your uterus was removed (hysterectomy) and you have not had cancer.    You should be tested each year for STDs (sexually transmitted diseases), if you're at risk.   Ask your doctor if you should have a mammogram.    Have a colonoscopy (test for colon cancer) if someone in your family has had colon cancer or polyps before age 50.     Have a cholesterol test every 5 years.     Have a diabetes test (fasting glucose) after age 45. If you are at risk for diabetes, you should have this test every 3 years.    Shots: Get a flu shot each year. Get a tetanus shot every 10 years.     Nutrition:   Eat at least 5 servings of fruits and vegetables each day.  Eat whole-grain bread, whole-wheat pasta and brown rice instead of white grains and rice.  Get adequate Calcium and Vitamin D.      Lifestyle  Exercise at least 150 minutes a week (an average of 30 minutes a day, 5 days a week). This will help you control your weight and prevent disease.  Limit alcohol to one drink per day.  No smoking.   Wear sunscreen to prevent skin cancer.  See your dentist every six months for an exam and cleaning.

## 2020-03-01 ENCOUNTER — HEALTH MAINTENANCE LETTER (OUTPATIENT)
Age: 45
End: 2020-03-01

## 2020-07-24 ENCOUNTER — OFFICE VISIT (OUTPATIENT)
Dept: PEDIATRICS | Facility: CLINIC | Age: 45
End: 2020-07-24
Payer: COMMERCIAL

## 2020-07-24 ENCOUNTER — ANCILLARY PROCEDURE (OUTPATIENT)
Dept: GENERAL RADIOLOGY | Facility: CLINIC | Age: 45
End: 2020-07-24
Attending: FAMILY MEDICINE
Payer: COMMERCIAL

## 2020-07-24 VITALS
RESPIRATION RATE: 16 BRPM | WEIGHT: 141.2 LBS | BODY MASS INDEX: 28.47 KG/M2 | HEIGHT: 59 IN | SYSTOLIC BLOOD PRESSURE: 112 MMHG | OXYGEN SATURATION: 98 % | TEMPERATURE: 97.2 F | HEART RATE: 78 BPM | DIASTOLIC BLOOD PRESSURE: 72 MMHG

## 2020-07-24 DIAGNOSIS — M25.562 ACUTE PAIN OF LEFT KNEE: Primary | ICD-10-CM

## 2020-07-24 PROCEDURE — 73562 X-RAY EXAM OF KNEE 3: CPT | Mod: LT | Performed by: RADIOLOGY

## 2020-07-24 PROCEDURE — 99213 OFFICE O/P EST LOW 20 MIN: CPT | Performed by: FAMILY MEDICINE

## 2020-07-24 RX ORDER — NAPROXEN 500 MG/1
500 TABLET ORAL 2 TIMES DAILY PRN
Qty: 30 TABLET | Refills: 0 | Status: SHIPPED | OUTPATIENT
Start: 2020-07-24 | End: 2021-05-07

## 2020-07-24 RX ORDER — METHYLPREDNISOLONE 4 MG
TABLET, DOSE PACK ORAL
Qty: 21 TABLET | Refills: 0 | Status: SHIPPED | OUTPATIENT
Start: 2020-07-24 | End: 2020-08-12

## 2020-07-24 ASSESSMENT — PAIN SCALES - GENERAL: PAINLEVEL: SEVERE PAIN (6)

## 2020-07-24 ASSESSMENT — MIFFLIN-ST. JEOR: SCORE: 1185.72

## 2020-07-24 NOTE — PATIENT INSTRUCTIONS
Patient Education     Understanding Meniscal Tears    The meniscus is a tough cushion of fibrous tissue called cartilage in the knee joint. It cushions the knee. It absorbs shock and helps spread weight across the knee joint. It also works with other parts of the knee to help keep the joint stable. Injury or aging can cause the meniscus to tear and lead to pain and problems using the knee.   What causes meniscal tears?  A sudden injury can tear the meniscus. This is often because of planting the foot and twisting the knee. Sports such as soccer, football, and basketball are often involved. Repeated actions, such as squatting, may also lead to a tear. Breakdown of the meniscus because of aging can also lead to tears.  Symptoms of meniscal tears  These can include:    Knee pain    Knee swelling    Catching of the knee or inability to straighten the knee    Unstable feeling in the knee  Treatment for meniscal tears  A tear is unlikely to heal on its own. You often will need surgery to repair a tear. In many cases, your healthcare provider will first try treatments to help relieve symptoms. These may include:    Rest the knee. This means avoiding any activity that puts stress on the knee joint. These include kneeling, squatting, jogging, and climbing stairs. In some cases, you may need to use crutches for a time to keep body weight off of the knee joint.    Cold pack. Putting a cold pack on the knee helps reduce pain and swelling.    Knee brace. Bracing the knee helps support it.    Medicine. Prescription and over-the-counter pain medicines can help relieve swelling and pain.    Exercises. Exercises help strengthen the muscles of the leg to help support the knee joint.  If these treatments don t help relieve symptoms or the injury is severe, you may need surgery. This can repair the meniscus to relieve symptoms and restore movement.     When to call your healthcare provider  Call your healthcare provider right away if  you have any of these:    Fever of 100.4 F (38 C) or higher, or as directed    Pain or swelling that gets worse, including pain in the calf    Numbness or tingling in leg or foot    You suddenly can t put any weight on your leg    Your knee  locks    Date Last Reviewed: 3/10/2016    4912-2057 [employer]. [address] [place] [address]. All rights reserved. This information is not intended as a substitute for professional medical care. Always follow your healthcare professional's instructions.         Rest the affected painful area as much as possible.  Apply ice for 15-20 minutes intermittently as needed and especially after any offending activity. Daily stretching.

## 2020-07-24 NOTE — PROGRESS NOTES
Subjective     Yael Morales is a 44 year old female who presents to clinic today for the following health issues:    HPI       Musculoskeletal problem/pain      Duration: Five weeks ago    Description  Location: Lateral aspect of left knee    Intensity:  moderate, 6/10    Accompanying signs and symptoms: swelling in the left knee    History  Previous similar problem: no   Previous evaluation:  none    Precipitating or alleviating factors:  Trauma or overuse: no   Aggravating factors include: walking, climbing stairs and lifting, flexion and extension    Therapies tried and outcome: nothing      Patient Active Problem List   Diagnosis     Hyperlipidemia LDL goal <160     H/O shoulder dystocia in prior pregnancy, currently pregnant     Gallstones     Need for Tdap vaccination     AMA (advanced maternal age) multigravida 35+     Gestational diabetes mellitus, antepartum     Abnormal maternal glucose tolerance, complicating pregnancy     Past Surgical History:   Procedure Laterality Date     LAPAROSCOPIC CHOLECYSTECTOMY N/A 12/15/2015    Procedure: LAPAROSCOPIC CHOLECYSTECTOMY;  Surgeon: Chasity Young MD;  Location: [facility] OR     NO HISTORY OF SURGERY         Social History     Tobacco Use     Smoking status: Never Smoker     Smokeless tobacco: Never Used   Substance Use Topics     Alcohol use: No     Family History   Problem Relation Age of Onset     C.A.D. Mother      Heart Disease Mother      Lipids Mother      Thyroid Disease Mother      Diabetes No family hx of      Asthma No family hx of      Hypertension No family hx of      Cerebrovascular Disease No family hx of      Breast Cancer No family hx of      Cancer - colorectal No family hx of      Prostate Cancer No family hx of      Coronary Artery Disease No family hx of      Colon Cancer No family hx of      Hyperlipidemia No family hx of      Other Cancer No family hx of      Depression No family hx of      Anxiety Disorder No family hx of      Mental  "Illness No family hx of      Substance Abuse No family hx of      Anesthesia Reaction No family hx of      Osteoporosis No family hx of      Genetic Disorder No family hx of          Current Outpatient Medications   Medication Sig Dispense Refill     methylPREDNISolone (MEDROL DOSEPAK) 4 MG tablet therapy pack Follow Package Directions 21 tablet 0     naproxen (NAPROSYN) 500 MG tablet Take 1 tablet (500 mg) by mouth 2 times daily as needed for moderate pain 30 tablet 0     order for DME Equipment being ordered: left knee brace 1 Units 0     No Known Allergies  Recent Labs   Lab Test 12/03/19  0905 11/19/18  1008 08/21/18  0809 11/16/16  1549  05/31/13  0756   A1C  --   --  5.7*  --   --   --    *  --  98  --   --  117   HDL 47*  --  39*  --   --  41*   TRIG 89  --  119  --   --  108   ALT  --  18 20 25   < >  --    CR 0.66  --  0.91 0.74   < >  --    GFRESTIMATED >90  --  67 85   < >  --    GFRESTBLACK >90  --  81 >90   GFR Calc     < >  --    POTASSIUM 4.0  --  4.2 3.8   < >  --    TSH  --  1.80 4.80* 2.31  --  5.02*    < > = values in this interval not displayed.      BP Readings from Last 3 Encounters:   07/24/20 112/72   12/03/19 113/70   05/23/19 110/74    Wt Readings from Last 3 Encounters:   07/24/20 64 kg (141 lb 3.2 oz)   12/03/19 64.4 kg (142 lb)   05/23/19 63.7 kg (140 lb 8 oz)                    Reviewed and updated as needed this visit by Provider  Tobacco  Allergies  Meds  Problems  Med Hx  Surg Hx  Fam Hx         Review of Systems   Constitutional, HEENT, cardiovascular, pulmonary, GI, , musculoskeletal, neuro, skin, endocrine and psych systems are negative, except as otherwise noted.      Objective    /72 (BP Location: Right arm, Patient Position: Sitting, Cuff Size: Adult Large)   Pulse 78   Temp 97.2  F (36.2  C) (Tympanic)   Resp 16   Ht 1.49 m (4' 10.66\")   Wt 64 kg (141 lb 3.2 oz)   SpO2 98%   BMI 28.85 kg/m    Body mass index is 28.85 " kg/m .  Physical Exam  Musculoskeletal:      Left knee: Tenderness found. Lateral joint line tenderness noted.        GENERAL: healthy, alert and no distress    Results for orders placed or performed in visit on 07/24/20   XR Knee Left 3 Views     Status: None    Narrative    3 views left knee radiographs 7/24/2020 3:31 PM    History: Acute pain of left knee    Additional history from EMR: Symptom for 5 weeks. No trauma.    Comparison: None available.    Findings:    Upright AP, lateral and patellofemoral views of the left knee were  obtained.     Incomplete vertical linear lucency at the lateral tibial plateau  centrally, only seen on the frontal projection, presumably artifactual  given lack of associated trauma history. Otherwise, no acute osseous  abnormality. Small joint effusion.    No substantial degenerative change.    No patellar tilt or lateral subluxation.  Soft tissue is unremarkable.      Impression    Impression:  1. Incomplete vertical linear lucency at the lateral tibial plateau  centrally, only seen on the frontal projection, presumably artifactual  given lack of associated trauma history. Otherwise, no acute osseous  abnormality.   2. No substantial degenerative change.    JESSICA CAMARENA         Assessment & Plan     1. Acute pain of left knee  Arthritis vs lateral meniscal injury  Rest the affected painful area as much as possible.  Apply ice for 15-20 minutes intermittently as needed and especially after any offending activity. Daily stretching.  As pain recedes, begin normal activities slowly as tolerated.  Consider Physical Therapy if symptoms not better with symptomatic care.    - order for DME; Equipment being ordered: left knee brace  Dispense: 1 Units; Refill: 0  - methylPREDNISolone (MEDROL DOSEPAK) 4 MG tablet therapy pack; Follow Package Directions  Dispense: 21 tablet; Refill: 0  - naproxen (NAPROSYN) 500 MG tablet; Take 1 tablet (500 mg) by mouth 2 times daily as needed for moderate  pain  Dispense: 30 tablet; Refill: 0  - XR Knee Left 3 Views         Return in about 3 weeks (around 8/14/2020).    Deisy Coyne MD  [facility]

## 2020-07-27 ENCOUNTER — TELEPHONE (OUTPATIENT)
Dept: PEDIATRICS | Facility: CLINIC | Age: 45
End: 2020-07-27

## 2020-07-27 NOTE — TELEPHONE ENCOUNTER
"Patient will call back to schedule if needed.  Patient states the medication is working and she can \"do the steps now\"    Julieth Mills  Primary Care   [facility] [place]    "

## 2020-08-12 ENCOUNTER — OFFICE VISIT (OUTPATIENT)
Dept: PEDIATRICS | Facility: CLINIC | Age: 45
End: 2020-08-12
Payer: COMMERCIAL

## 2020-08-12 VITALS
HEART RATE: 69 BPM | WEIGHT: 139.3 LBS | TEMPERATURE: 97.3 F | BODY MASS INDEX: 28.46 KG/M2 | DIASTOLIC BLOOD PRESSURE: 60 MMHG | OXYGEN SATURATION: 98 % | SYSTOLIC BLOOD PRESSURE: 110 MMHG

## 2020-08-12 DIAGNOSIS — M25.562 ACUTE PAIN OF LEFT KNEE: Primary | ICD-10-CM

## 2020-08-12 DIAGNOSIS — S83.282D TEAR OF LATERAL MENISCUS OF LEFT KNEE, CURRENT, UNSPECIFIED TEAR TYPE, SUBSEQUENT ENCOUNTER: ICD-10-CM

## 2020-08-12 PROCEDURE — 99213 OFFICE O/P EST LOW 20 MIN: CPT | Performed by: FAMILY MEDICINE

## 2020-08-12 NOTE — PROGRESS NOTES
Subjective     Yael Morales is a 44 year old female who presents to clinic today for the following health issues:    HPI       Musculoskeletal problem/pain  Patient was knee pain follow up, was seen for left knee pain with diagnosis of probably left meniscus injury. She was prescribed a brace and oral antiinflammatory and encouraged to follow up. Her knee felt much improved so didn't need a follow up but pain with walking up the step started again about a week ago, she denies trauma or new injuries.        Patient Active Problem List   Diagnosis     Hyperlipidemia LDL goal <160     H/O shoulder dystocia in prior pregnancy, currently pregnant     Gallstones     Need for Tdap vaccination     AMA (advanced maternal age) multigravida 35+     Gestational diabetes mellitus, antepartum     Abnormal maternal glucose tolerance, complicating pregnancy     Past Surgical History:   Procedure Laterality Date     LAPAROSCOPIC CHOLECYSTECTOMY N/A 12/15/2015    Procedure: LAPAROSCOPIC CHOLECYSTECTOMY;  Surgeon: Chasity Young MD;  Location: [facility]     NO HISTORY OF SURGERY         Social History     Tobacco Use     Smoking status: Never Smoker     Smokeless tobacco: Never Used   Substance Use Topics     Alcohol use: No     Family History   Problem Relation Age of Onset     C.A.D. Mother      Heart Disease Mother      Lipids Mother      Thyroid Disease Mother      Diabetes No family hx of      Asthma No family hx of      Hypertension No family hx of      Cerebrovascular Disease No family hx of      Breast Cancer No family hx of      Cancer - colorectal No family hx of      Prostate Cancer No family hx of      Coronary Artery Disease No family hx of      Colon Cancer No family hx of      Hyperlipidemia No family hx of      Other Cancer No family hx of      Depression No family hx of      Anxiety Disorder No family hx of      Mental Illness No family hx of      Substance Abuse No family hx of      Anesthesia Reaction No family  hx of      Osteoporosis No family hx of      Genetic Disorder No family hx of          Current Outpatient Medications   Medication Sig Dispense Refill     naproxen (NAPROSYN) 500 MG tablet Take 1 tablet (500 mg) by mouth 2 times daily as needed for moderate pain 30 tablet 0     order for DME Equipment being ordered: left knee brace 1 Units 0     No Known Allergies  Recent Labs   Lab Test 12/03/19  0905 11/19/18  1008 08/21/18  0809 11/16/16  1549  05/31/13  0756   A1C  --   --  5.7*  --   --   --    *  --  98  --   --  117   HDL 47*  --  39*  --   --  41*   TRIG 89  --  119  --   --  108   ALT  --  18 20 25   < >  --    CR 0.66  --  0.91 0.74   < >  --    GFRESTIMATED >90  --  67 85   < >  --    GFRESTBLACK >90  --  81 >90   GFR Calc     < >  --    POTASSIUM 4.0  --  4.2 3.8   < >  --    TSH  --  1.80 4.80* 2.31  --  5.02*    < > = values in this interval not displayed.      BP Readings from Last 3 Encounters:   08/12/20 110/60   07/24/20 112/72   12/03/19 113/70    Wt Readings from Last 3 Encounters:   08/12/20 63.2 kg (139 lb 4.8 oz)   07/24/20 64 kg (141 lb 3.2 oz)   12/03/19 64.4 kg (142 lb)                    Reviewed and updated as needed this visit by Provider  Med Hx  Surg Hx  Fam Hx         Review of Systems   Constitutional, HEENT, cardiovascular, pulmonary, GI, , musculoskeletal, neuro, skin, endocrine and psych systems are negative, except as otherwise noted.      Objective    /60   Pulse 69   Temp 97.3  F (36.3  C)   Wt 63.2 kg (139 lb 4.8 oz)   SpO2 98%   BMI 28.46 kg/m    Body mass index is 28.46 kg/m .  Physical Exam  Musculoskeletal:      Left knee: She exhibits bony tenderness. Tenderness found. LCL tenderness noted.        GENERAL: healthy, alert and no distress            Assessment & Plan     1. Acute pain of left knee  Rest the affected painful area as much as possible.  Apply ice for 15-20 minutes intermittently as needed and especially after any  offending activity. Daily stretching.      - PHYSICAL THERAPY REFERRAL; Future  - MR Knee Left w/o Contrast; Future    2. Tear of lateral meniscus of left knee, current, unspecified tear type, subsequent encounter  - MR Knee Left w/o Contrast; Future         No follow-ups on file.    Deisy Coyne MD  [facility]

## 2020-09-04 ENCOUNTER — ANCILLARY PROCEDURE (OUTPATIENT)
Dept: MRI IMAGING | Facility: CLINIC | Age: 45
End: 2020-09-04
Attending: FAMILY MEDICINE
Payer: COMMERCIAL

## 2020-09-04 DIAGNOSIS — S83.282D TEAR OF LATERAL MENISCUS OF LEFT KNEE, CURRENT, UNSPECIFIED TEAR TYPE, SUBSEQUENT ENCOUNTER: ICD-10-CM

## 2020-09-04 DIAGNOSIS — M25.562 ACUTE PAIN OF LEFT KNEE: ICD-10-CM

## 2020-09-04 PROCEDURE — 73721 MRI JNT OF LWR EXTRE W/O DYE: CPT | Mod: LT | Performed by: RADIOLOGY

## 2020-09-10 DIAGNOSIS — S83.282A TEAR OF LATERAL MENISCUS OF LEFT KNEE, CURRENT, UNSPECIFIED TEAR TYPE, INITIAL ENCOUNTER: Primary | ICD-10-CM

## 2020-09-18 NOTE — PROGRESS NOTES
SUBJECTIVE:   Yael Morales is a 44 year old female who is seen in consultation at the request of Dr. Coyne for evaluation of left knee pain. It has been approximately 3 months since the initial injury.   Mechanism: no injury. But pain started on steps, then became swollen    Present symptoms: a bit better now.  Pain with walking a lot at work,   Stairs    Pain extending knee, pain anteriorly, anterolateral    She was prescribed a brace and oral antiinflammatory     Review of Systems:  Constitutional:  NEGATIVE for fever, chills, change in weight  Integumentary/Skin:  NEGATIVE for worrisome rashes, moles or lesions  Eyes:  NEGATIVE for vision changes or irritation  ENT/Mouth:  NEGATIVE for ear, mouth and throat problems  Resp:  NEGATIVE for significant cough or SOB  Breast:  NEGATIVE for masses, tenderness or discharge  CV:  NEGATIVE for chest pain, palpitations or peripheral edema  GI:  NEGATIVE for nausea, abdominal pain, heartburn, or change in bowel habits  :  Negative   Musculoskeletal:  See HPI above  Neuro:  NEGATIVE for weakness, dizziness or paresthesias  Endocrine:  NEGATIVE for temperature intolerance, skin/hair changes  Heme/allergy/immune:  NEGATIVE for bleeding problems  Psychiatric:  NEGATIVE for changes in mood or affect    Past Medical History:   Past Medical History:   Diagnosis Date     Cholelithiases      Plantar warts 2011     Past Surgical History:   Past Surgical History:   Procedure Laterality Date     LAPAROSCOPIC CHOLECYSTECTOMY N/A 12/15/2015    Procedure: LAPAROSCOPIC CHOLECYSTECTOMY;  Surgeon: Chasity Young MD;  Location: [facility]     NO HISTORY OF SURGERY       Family History:   Family History   Problem Relation Age of Onset     C.A.D. Mother      Heart Disease Mother      Lipids Mother      Thyroid Disease Mother      Diabetes No family hx of      Asthma No family hx of      Hypertension No family hx of      Cerebrovascular Disease No family hx of      Breast Cancer No  family hx of      Cancer - colorectal No family hx of      Prostate Cancer No family hx of      Coronary Artery Disease No family hx of      Colon Cancer No family hx of      Hyperlipidemia No family hx of      Other Cancer No family hx of      Depression No family hx of      Anxiety Disorder No family hx of      Mental Illness No family hx of      Substance Abuse No family hx of      Anesthesia Reaction No family hx of      Osteoporosis No family hx of      Genetic Disorder No family hx of      Social History:   Social History     Tobacco Use     Smoking status: Never Smoker     Smokeless tobacco: Never Used   Substance Use Topics     Alcohol use: No       OBJECTIVE:  Physical Exam:  There were no vitals taken for this visit.  General Appearance: healthy, alert and no distress   Skin: no suspicious lesions or rashes  Neuro: Normal strength and tone, mentation intact and speech normal  Vascular: good pulses, and cappillary refill   Lymph: no lymphadenopathy   Psych:  mentation appears normal and affect normal/bright  Resp: no increased work of breathing     Left Knee Exam:  Gait: walks with normal gait  Alignment: normal   Squat: 100 % painfree.    Patellofemoral joint: moderate crepitations in the patellofemoral joint.  Extensor lag: none  Effusion: mild  ROM: full  Tender: non-tender   Masses: none, but slight fullness proximal to patella  Ligaments:   Lachman's: stable    Anterior Drawer: stable    Pivot Shift: neg   Posterior drawer: stable    Varus/Valgus stress: stable   McMurrays: negative    X-rays:  Obtained 7/24/20 of the left knee reviewed in the office with the patient today show :    1. Incomplete vertical linear lucency at the lateral tibial plateau  centrally, only seen on the frontal projection, presumably artifactual  given lack of associated trauma history. Otherwise, no acute osseous  abnormality.   2. No substantial degenerative change.       MRI:    From 9/4/20 of the left knee  showed:    Impression:  1. Grade 1 sprain of the tibial collateral ligament, posterior oblique  ligament and medial retinaculum.  2. Horizontal tear of the anterior horn of the lateral meniscus,  extending into the anterior root ligament, without a cyst.. Small joint effusion and  moderate Baker's cyst.  3. Small amount of edema deep to the iliotibial band, can be seen in  the setting of iliotibial band friction syndrome.  4. Grade IV chondromalacia patellofemoral compartment.      ASSESSMENT:   Encounter Diagnoses   Name Primary?     Other tear of lateral meniscus of left knee, unspecified whether old or current tear, initial encounter Yes     Patellofemoral arthritis of left knee    Baker's cyst-- asymptomatic     PLAN:   nsaids and physical therapy suggested  Corticosteroid injection a possibility in the future  Surgery a last resort since anterior horn tears notoriously don't respond well to surgical treatment.  Problems and options discussed in detail  Return to clinic: as needed     KENTON Bartholomew MD  Dept. Orthopedic Surgery  [facility]

## 2020-09-22 ENCOUNTER — OFFICE VISIT (OUTPATIENT)
Dept: ORTHOPEDICS | Facility: CLINIC | Age: 45
End: 2020-09-22
Attending: FAMILY MEDICINE
Payer: COMMERCIAL

## 2020-09-22 VITALS
BODY MASS INDEX: 28.02 KG/M2 | HEIGHT: 59 IN | SYSTOLIC BLOOD PRESSURE: 118 MMHG | WEIGHT: 139 LBS | OXYGEN SATURATION: 100 % | DIASTOLIC BLOOD PRESSURE: 74 MMHG | HEART RATE: 79 BPM

## 2020-09-22 DIAGNOSIS — M17.12 PATELLOFEMORAL ARTHRITIS OF LEFT KNEE: ICD-10-CM

## 2020-09-22 DIAGNOSIS — S83.282A TEAR OF LATERAL MENISCUS OF LEFT KNEE, CURRENT, UNSPECIFIED TEAR TYPE, INITIAL ENCOUNTER: ICD-10-CM

## 2020-09-22 DIAGNOSIS — S83.282A OTHER TEAR OF LATERAL MENISCUS OF LEFT KNEE, UNSPECIFIED WHETHER OLD OR CURRENT TEAR, INITIAL ENCOUNTER: Primary | ICD-10-CM

## 2020-09-22 PROCEDURE — 99243 OFF/OP CNSLTJ NEW/EST LOW 30: CPT | Performed by: ORTHOPAEDIC SURGERY

## 2020-09-22 ASSESSMENT — MIFFLIN-ST. JEOR: SCORE: 1181.13

## 2020-09-22 NOTE — LETTER
9/22/2020         RE: Yael Morales  [address]  [place] [address]        Dear Colleague,    Thank you for referring your patient, Yael Morales, to the [facility]. Please see a copy of my visit note below.    SUBJECTIVE:   Yael Morales is a 44 year old female who is seen in consultation at the request of Dr. Coyne for evaluation of left knee pain. It has been approximately 3 months since the initial injury.   Mechanism: no injury. But pain started on steps, then became swollen    Present symptoms: a bit better now.  Pain with walking a lot at work,   Stairs    Pain extending knee, pain anteriorly, anterolateral    She was prescribed a brace and oral antiinflammatory     Review of Systems:  Constitutional:  NEGATIVE for fever, chills, change in weight  Integumentary/Skin:  NEGATIVE for worrisome rashes, moles or lesions  Eyes:  NEGATIVE for vision changes or irritation  ENT/Mouth:  NEGATIVE for ear, mouth and throat problems  Resp:  NEGATIVE for significant cough or SOB  Breast:  NEGATIVE for masses, tenderness or discharge  CV:  NEGATIVE for chest pain, palpitations or peripheral edema  GI:  NEGATIVE for nausea, abdominal pain, heartburn, or change in bowel habits  :  Negative   Musculoskeletal:  See HPI above  Neuro:  NEGATIVE for weakness, dizziness or paresthesias  Endocrine:  NEGATIVE for temperature intolerance, skin/hair changes  Heme/allergy/immune:  NEGATIVE for bleeding problems  Psychiatric:  NEGATIVE for changes in mood or affect    Past Medical History:   Past Medical History:   Diagnosis Date     Cholelithiases      Plantar warts 2011     Past Surgical History:   Past Surgical History:   Procedure Laterality Date     LAPAROSCOPIC CHOLECYSTECTOMY N/A 12/15/2015    Procedure: LAPAROSCOPIC CHOLECYSTECTOMY;  Surgeon: Chasity Young MD;  Location:  OR     NO HISTORY OF SURGERY       Family History:   Family History   Problem Relation Age of Onset     C.A.D.  Mother      Heart Disease Mother      Lipids Mother      Thyroid Disease Mother      Diabetes No family hx of      Asthma No family hx of      Hypertension No family hx of      Cerebrovascular Disease No family hx of      Breast Cancer No family hx of      Cancer - colorectal No family hx of      Prostate Cancer No family hx of      Coronary Artery Disease No family hx of      Colon Cancer No family hx of      Hyperlipidemia No family hx of      Other Cancer No family hx of      Depression No family hx of      Anxiety Disorder No family hx of      Mental Illness No family hx of      Substance Abuse No family hx of      Anesthesia Reaction No family hx of      Osteoporosis No family hx of      Genetic Disorder No family hx of      Social History:   Social History     Tobacco Use     Smoking status: Never Smoker     Smokeless tobacco: Never Used   Substance Use Topics     Alcohol use: No       OBJECTIVE:  Physical Exam:  There were no vitals taken for this visit.  General Appearance: healthy, alert and no distress   Skin: no suspicious lesions or rashes  Neuro: Normal strength and tone, mentation intact and speech normal  Vascular: good pulses, and cappillary refill   Lymph: no lymphadenopathy   Psych:  mentation appears normal and affect normal/bright  Resp: no increased work of breathing     Left Knee Exam:  Gait: walks with normal gait  Alignment: normal   Squat: 100 % painfree.    Patellofemoral joint: moderate crepitations in the patellofemoral joint.  Extensor lag: none  Effusion: mild  ROM: full  Tender: non-tender   Masses: none, but slight fullness proximal to patella  Ligaments:   Lachman's: stable    Anterior Drawer: stable    Pivot Shift: neg   Posterior drawer: stable    Varus/Valgus stress: stable   McMurrays: negative    X-rays:  Obtained 7/24/20 of the left knee reviewed in the office with the patient today show :    1. Incomplete vertical linear lucency at the lateral tibial plateau  centrally, only  seen on the frontal projection, presumably artifactual  given lack of associated trauma history. Otherwise, no acute osseous  abnormality.   2. No substantial degenerative change.       MRI:    From 9/4/20 of the left knee showed:    Impression:  1. Grade 1 sprain of the tibial collateral ligament, posterior oblique  ligament and medial retinaculum.  2. Horizontal tear of the anterior horn of the lateral meniscus,  extending into the anterior root ligament, without a cyst.. Small joint effusion and  moderate Baker's cyst.  3. Small amount of edema deep to the iliotibial band, can be seen in  the setting of iliotibial band friction syndrome.  4. Grade IV chondromalacia patellofemoral compartment.      ASSESSMENT:   Encounter Diagnoses   Name Primary?     Other tear of lateral meniscus of left knee, unspecified whether old or current tear, initial encounter Yes     Patellofemoral arthritis of left knee    Baker's cyst-- asymptomatic     PLAN:   nsaids and physical therapy suggested  Corticosteroid injection a possibility in the future  Surgery a last resort since anterior horn tears notoriously don't respond well to surgical treatment.  Problems and options discussed in detail  Return to clinic: as needed     KENTON Bartholomew MD  Dept. Orthopedic Surgery  [facility]     Again, thank you for allowing me to participate in the care of your patient.        Sincerely,        Carlos Bartholomew MD

## 2020-10-12 ENCOUNTER — THERAPY VISIT (OUTPATIENT)
Dept: PHYSICAL THERAPY | Facility: CLINIC | Age: 45
End: 2020-10-12
Attending: ORTHOPAEDIC SURGERY
Payer: COMMERCIAL

## 2020-10-12 DIAGNOSIS — S83.282A OTHER TEAR OF LATERAL MENISCUS OF LEFT KNEE, UNSPECIFIED WHETHER OLD OR CURRENT TEAR, INITIAL ENCOUNTER: ICD-10-CM

## 2020-10-12 DIAGNOSIS — M17.12 PATELLOFEMORAL ARTHRITIS OF LEFT KNEE: ICD-10-CM

## 2020-10-12 DIAGNOSIS — M25.562 LEFT KNEE PAIN: ICD-10-CM

## 2020-10-12 PROCEDURE — 97110 THERAPEUTIC EXERCISES: CPT | Mod: GP | Performed by: PHYSICAL THERAPIST

## 2020-10-12 PROCEDURE — 97161 PT EVAL LOW COMPLEX 20 MIN: CPT | Mod: GP | Performed by: PHYSICAL THERAPIST

## 2020-10-12 ASSESSMENT — ACTIVITIES OF DAILY LIVING (ADL)
LIMPING: THE SYMPTOM AFFECTS MY ACTIVITY MODERATELY
KNEE_ACTIVITY_OF_DAILY_LIVING_SUM: 15
PAIN: THE SYMPTOM AFFECTS MY ACTIVITY MODERATELY
GO UP STAIRS: ACTIVITY IS FAIRLY DIFFICULT
WEAKNESS: NOT ANSWERED
RISE FROM A CHAIR: NOT ANSWERED
SQUAT: ACTIVITY IS FAIRLY DIFFICULT
SIT WITH YOUR KNEE BENT: NOT ANSWERED
GO DOWN STAIRS: ACTIVITY IS FAIRLY DIFFICULT
STAND: NOT ANSWERED
SWELLING: THE SYMPTOM AFFECTS MY ACTIVITY MODERATELY
HOW_WOULD_YOU_RATE_THE_CURRENT_FUNCTION_OF_YOUR_KNEE_DURING_YOUR_USUAL_DAILY_ACTIVITIES_ON_A_SCALE_FROM_0_TO_100_WITH_100_BEING_YOUR_LEVEL_OF_KNEE_FUNCTION_PRIOR_TO_YOUR_INJURY_AND_0_BEING_THE_INABILITY_TO_PERFORM_ANY_OF_YOUR_USUAL_DAILY_ACTIVITIES?: 1
HOW_WOULD_YOU_RATE_THE_OVERALL_FUNCTION_OF_YOUR_KNEE_DURING_YOUR_USUAL_DAILY_ACTIVITIES?: NEARLY NORMAL
STIFFNESS: NOT ANSWERED
WALK: ACTIVITY IS FAIRLY DIFFICULT
KNEEL ON THE FRONT OF YOUR KNEE: ACTIVITY IS FAIRLY DIFFICULT
AS_A_RESULT_OF_YOUR_KNEE_INJURY,_HOW_WOULD_YOU_RATE_YOUR_CURRENT_LEVEL_OF_DAILY_ACTIVITY?: NEARLY NORMAL
GIVING WAY, BUCKLING OR SHIFTING OF KNEE: NOT ANSWERED

## 2020-10-12 NOTE — PROGRESS NOTES
[facility] Initial Evaluation  Subjective:  The history is provided by the patient. No  was used.   Patient Health History  Yael Morales being seen for L knee pain .     Date of Onset: MD order for PT 9/22/20.   Problem occurred: onset of L knee pain about 4 months ago for no reason. She reports improvement currently however pain continues going down stairs, with alot of walking, kneeling or squatting. X-rays/MRI were done showing a cartilage tear and arthritis.   Pain is reported as 1/10 on pain scale.  General health as reported by patient is good.  Pertinent medical history includes: implanted device.   Red flags:  None as reported by patient.  Medical allergies: none.   Surgeries include:  Other (gall bladder 2015).    Current medications:  None.    Current occupation is .   Primary job tasks include:  Prolonged standing.                  Therapist Generated HPI Evaluation         Type of problem:  Left knee.    This is a chronic condition.  Condition occurred with:  Insidious onset.  Where condition occurred: for unknown reasons.  Patient reports pain:  Anterior.  and is intermittent.  Pain is the same all the time.  Since onset symptoms are gradually improving.  Associated symptoms:  Loss of motion/stiffness. Symptoms are exacerbated by descending stairs, walking, kneeling and bending/squatting  and relieved by nothing.  Special tests included:  X-ray and MRI.    Restrictions due to condition include:  Working in normal job without restrictions.  Barriers include:  None as reported by patient.                        Objective:  Standing Alignment:                Ankle/Foot:  Pes planus L and pes planus R        Flexibility/Screens:       Lower Extremity:  Decreased left lower extremity flexibility:Hamstrings and Gastroc                                                        Knee Evaluation:  ROM:    AROM    Hyperextension:  Left:  0    Right:  0  Extension:  Left: 4+    Right:  0  Flexion: Left: 135    Right: 135  PROM    Hyperextension: Left: 2    Right:  5  Extension: Left: 0    Right:  0  Flexion: Left: 140    Right:  140      Strength:     Extension:  Left: 5-/5    Pain:+/-        Flexion:  Left: 5-/5    Pain:+/-        Quad Set Left:  Good    Pain: -         Palpation:  Normal      Edema:  Normal    Mobility Testing:      Patellofemoral Medial:  Left: hypomobile            Functional Testing:          Quad:    Single Leg Squat:  Left:       Right:        Bilateral Leg Squat:  Mild L knee pain  Hip substitution              General     ROS    Assessment/Plan:    Patient is a 44 year old female with left side knee complaints.    Patient has the following significant findings with corresponding treatment plan.                Diagnosis 1:  PF knee pain, meniscus tear Pain -  hot/cold therapy, self management, education and home program  Decreased ROM/flexibility - manual therapy, therapeutic exercise and home program  Decreased strength - therapeutic exercise, therapeutic activities and home program  Impaired muscle performance - neuro re-education and home program  Decreased function - therapeutic activities and home program    Therapy Evaluation Codes:   1) History comprised of:   Personal factors that impact the plan of care:      None.    Comorbidity factors that impact the plan of care are:      None.     Medications impacting care: None.  2) Examination of Body Systems comprised of:   Body structures and functions that impact the plan of care:      Knee.   Activity limitations that impact the plan of care are:      Squatting/kneeling, Stairs, Walking and Working.  3) Clinical presentation characteristics are:   Stable/Uncomplicated.  4) Decision-Making    Low complexity using standardized patient assessment instrument and/or measureable assessment of functional outcome.  Cumulative Therapy Evaluation is: Low complexity.    Previous and current  functional limitations:  (See Goal Flow Sheet for this information)    Short term and Long term goals: (See Goal Flow Sheet for this information)     Communication ability:  Patient appears to be able to clearly communicate and understand verbal and written communication and follow directions correctly.  Treatment Explanation - The following has been discussed with the patient:   RX ordered/plan of care  Anticipated outcomes  Possible risks and side effects  This patient would benefit from PT intervention to resume normal activities.   Rehab potential is good.    Frequency:  1 X week, once daily  Duration:  for 6 weeks  Discharge Plan:  Achieve all LTG.  Independent in home treatment program.  Reach maximal therapeutic benefit.    Please refer to the daily flowsheet for treatment today, total treatment time and time spent performing 1:1 timed codes.

## 2020-12-14 ENCOUNTER — HEALTH MAINTENANCE LETTER (OUTPATIENT)
Age: 45
End: 2020-12-14

## 2020-12-17 PROBLEM — M25.562 LEFT KNEE PAIN: Status: RESOLVED | Noted: 2020-10-12 | Resolved: 2020-12-17

## 2021-01-15 ENCOUNTER — HEALTH MAINTENANCE LETTER (OUTPATIENT)
Age: 46
End: 2021-01-15

## 2021-04-18 ENCOUNTER — HEALTH MAINTENANCE LETTER (OUTPATIENT)
Age: 46
End: 2021-04-18

## 2021-05-05 NOTE — PATIENT INSTRUCTIONS
Preventive Health Recommendations  Female Ages 40 to 49    Yearly exam:     See your health care provider every year in order to  1. Review health changes.   2. Discuss preventive care.    3. Review your medicines if your doctor prescribed any.      Get a Pap test every three years (unless you have an abnormal result and your provider advises testing more often).      If you get Pap tests with HPV test, you only need to test every 5 years, unless you have an abnormal result. You do not need a Pap test if your uterus was removed (hysterectomy) and you have not had cancer.      You should be tested each year for STDs (sexually transmitted diseases), if you're at risk.     Ask your doctor if you should have a mammogram.      Have a colonoscopy (test for colon cancer) if someone in your family has had colon cancer or polyps before age 50.       Have a cholesterol test every 5 years.       Have a diabetes test (fasting glucose) after age 45. If you are at risk for diabetes, you should have this test every 3 years.    Shots: Get a flu shot each year. Get a tetanus shot every 10 years.     Nutrition:     Eat at least 5 servings of fruits and vegetables each day.    Eat whole-grain bread, whole-wheat pasta and brown rice instead of white grains and rice.    Get adequate Calcium and Vitamin D.      Lifestyle    Exercise at least 150 minutes a week (an average of 30 minutes a day, 5 days a week). This will help you control your weight and prevent disease.    Limit alcohol to one drink per day.    No smoking.     Wear sunscreen to prevent skin cancer.  See your dentist every six months for an exam and cleaning.Patient Education   Preventing Diabetes  What is diabetes?  Diabetes is a disease that causes high blood sugar. Over time, high blood sugar can lead to a number of health problems if not treated. You can take action to prevent diabetes.  You are at risk for diabetes if you:  Are overweight  Don't get enough  exercise  Have a parent, brother or sister with diabetes  Are , , ,  American or   Have high blood pressure (over 130/80)  Have low HDL cholesterol (35 or lower)  Have high triglycerides (150 or higher)  Are over age 45  Have had a baby weighing more than 9 pounds  Have had gestational diabetes (diabetes during pregnancy)  Have PCOS (polycystic ovary syndrome)  Have pre-diabetes  What is pre-diabetes?  Pre-diabetes is when your blood sugar level is higher than normal, but not high enough to be called diabetes.  Pre-diabetes will usually turn into diabetes in a short time if you do not change your health habits.  Damage to your body, especially the heart and blood vessels, may already be occurring with pre-diabetes. If you have pre-diabetes, it is important to start making healthy choices now.  How can I reduce my risk of getting diabetes?  You can prevent or delay getting diabetes with healthy lifestyle choices. These steps can reduce the risk for diabetes in 6 out of 10 people:   Exercise 30 minutes a day, at least five times per week (or 150 minutes of exercise per week).  Lose weight if you need to. If you are overweight, losing just 5 to 10% of your body weight (an average of 15 pounds) may reduce your risk.  Cut back calories in your diet. Choose healthy, nutritious, and unprocessed foods (lean meats, plant proteins, whole grains, fruits and vegetables).  If your doctor diagnosed you with pre-diabetes, you should also see your doctor every year to check for diabetes.  How would I know if I had diabetes?  To check for diabetes, your doctor will do a blood test to measure your blood sugar. You cannot eat or drink anything for several hours before this test.  If your blood sugar is less than 100, you do not have diabetes.  If it is between 100 and 125, you have pre-diabetes.  If it is 126 or higher on two different days, you have diabetes.  The doctor may  order more tests to confirm the results.  What should I do if I get diabetes?  If not managed, diabetes can lead to blindness, kidney failure, nerve damage, heart attack or stroke.  You will greatly reduce your risks if you manage your diabetes. Managing diabetes includes eating healthy, getting more exercise, checking blood sugar and often taking diabetes medicine.  It is also important to:  Learn as much as you can about diabetes. This will give you the tools you need to build healthy habits. Be sure to meet with a diabetes educator.  Work closely with a doctor who understands diabetes.  Get support from family and friends. Support is vital when you are making changes in your life.  How can I find a diabetes prevention and education program?  [facility] offers complete diabetes education, including diabetes prevention and ongoing care. Call [phone] for details or to schedule a visit.  The Centers for Disease Control has a listing of all of the Diabetes Prevention Programs available. Go to https://nccd.cdc.gov/DDT_DPRP/Programs.aspx.  Or call The American Diabetes Association at 0-429-DIABETES and ask for a program near you. You can also check their website at www.diabetes.org.  For informational purposes only. Not to replace the advice of your health care provider.  Clinically reviewed by [facility].  Copyright   2008 [facility]. All rights reserved. Done In :60 Seconds [id number] - REV 02/19.

## 2021-05-05 NOTE — PROGRESS NOTES
SUBJECTIVE:   CC: Yael Morales is an 45 year old woman who presents for preventive health visit.       Patient has been advised of split billing requirements and indicates understanding: Yes  Healthy Habits:     Getting at least 3 servings of Calcium per day:  Yes    Bi-annual eye exam:  NO    Dental care twice a year:  Yes    Sleep apnea or symptoms of sleep apnea:  None    Diet:  Carbohydrate counting    Frequency of exercise:  None    Taking medications regularly:  Not Applicable    Medication side effects:  Not applicable    PHQ-2 Total Score: 0    Additional concerns today:  Yes (Lab work, had gallbladder removed - wondering if she should start taking medication to help digestion. numb hands, menopause)    -Heavy periods with bleeding and concerns about ?menopause  -Chronic hives and seasonal allergies, unsure of etiology. She request a food allergy test as well.          Today's PHQ-2 Score:   PHQ-2 ( 1999 [employer]) 5/7/2021   Q1: Little interest or pleasure in doing things 0   Q2: Feeling down, depressed or hopeless 0   PHQ-2 Score 0   Q1: Little interest or pleasure in doing things Not at all   Q2: Feeling down, depressed or hopeless Not at all   PHQ-2 Score 0       Abuse: Current or Past (Physical, Sexual or Emotional) - No  Do you feel safe in your environment? Yes    Have you ever done Advance Care Planning? (For example, a Health Directive, POLST, or a discussion with a medical provider or your loved ones about your wishes): No, advance care planning information given to patient to review.  Advanced care planning was discussed at today's visit.    Social History     Tobacco Use     Smoking status: Never Smoker     Smokeless tobacco: Never Used   Substance Use Topics     Alcohol use: No         Alcohol Use 5/7/2021   Prescreen: >3 drinks/day or >7 drinks/week? No   Prescreen: >3 drinks/day or >7 drinks/week? -   No flowsheet data found.    Reviewed orders with patient.  Reviewed health maintenance and  updated orders accordingly - Yes  BP Readings from Last 3 Encounters:   05/07/21 118/80   09/22/20 118/74   08/12/20 110/60    Wt Readings from Last 3 Encounters:   05/07/21 64 kg (141 lb 3.2 oz)   09/22/20 63 kg (139 lb)   08/12/20 63.2 kg (139 lb 4.8 oz)                  Patient Active Problem List   Diagnosis     Hyperlipidemia LDL goal <160     H/O shoulder dystocia in prior pregnancy, currently pregnant     Gallstones     AMA (advanced maternal age) multigravida 35+     Gestational diabetes mellitus, antepartum     Abnormal maternal glucose tolerance, complicating pregnancy     Past Surgical History:   Procedure Laterality Date     LAPAROSCOPIC CHOLECYSTECTOMY N/A 12/15/2015    Procedure: LAPAROSCOPIC CHOLECYSTECTOMY;  Surgeon: Chasity Young MD;  Location:  OR     NO HISTORY OF SURGERY         Social History     Tobacco Use     Smoking status: Never Smoker     Smokeless tobacco: Never Used   Substance Use Topics     Alcohol use: No     Family History   Problem Relation Age of Onset     C.A.D. Mother      Heart Disease Mother      Lipids Mother      Thyroid Disease Mother      Diabetes No family hx of      Asthma No family hx of      Hypertension No family hx of      Cerebrovascular Disease No family hx of      Breast Cancer No family hx of      Cancer - colorectal No family hx of      Prostate Cancer No family hx of      Coronary Artery Disease No family hx of      Colon Cancer No family hx of      Hyperlipidemia No family hx of      Other Cancer No family hx of      Depression No family hx of      Anxiety Disorder No family hx of      Mental Illness No family hx of      Substance Abuse No family hx of      Anesthesia Reaction No family hx of      Osteoporosis No family hx of      Genetic Disorder No family hx of          Current Outpatient Medications   Medication Sig Dispense Refill     Cholecalciferol (VITAMIN D-3) 125 MCG (5000 UT) TABS Take 1 tablet by mouth daily       No Known Allergies  Recent  Labs   Lab Test 05/07/21  1037 12/03/19  0905 11/19/18  1008 08/21/18  0809   A1C 6.0*  --   --  5.7*   * 114*  --  98   HDL 53 47*  --  39*   TRIG 57 89  --  119   ALT 20  --  18 20   CR 0.74 0.66  --  0.91   GFRESTIMATED >90 >90  --  67   GFRESTBLACK >90 >90  --  81   POTASSIUM 4.1 4.0  --  4.2   TSH 1.44  --  1.80 4.80*        Breast Cancer Screening:  Any new diagnosis of family breast, ovarian, or bowel cancer? Yes   FSH-7: No flowsheet data found.  click delete button to remove this line now  Mammogram Screening - Offered annual screening and updated [employer] for [place] plan based on risk factor consideration   and Mammogram Screening: Recommended annual mammography  Pertinent mammograms are reviewed under the imaging tab.    History of abnormal Pap smear: NO - age 21-29 PAP every 3 years recommended  NO - age 30- 65 PAP every 3 years recommended  NO - age 30-65 PAP every 5 years with negative HPV co-testing recommended  PAP / HPV Latest Ref Rng & Units 8/21/2018 9/12/2014 1/31/2014   PAP - NIL NIL NIL   HPV 16 DNA NEG:Negative Negative - -   HPV 18 DNA NEG:Negative Negative - -   OTHER HR HPV NEG:Negative Negative - -     Reviewed and updated as needed this visit by clinical staff  Tobacco  Allergies  Meds   Med Hx  Surg Hx  Fam Hx  Soc Hx        Reviewed and updated as needed this visit by Provider                Past Medical History:   Diagnosis Date     Cholelithiases      Plantar warts 2011      Past Surgical History:   Procedure Laterality Date     LAPAROSCOPIC CHOLECYSTECTOMY N/A 12/15/2015    Procedure: LAPAROSCOPIC CHOLECYSTECTOMY;  Surgeon: Chasity Young MD;  Location:  OR     NO HISTORY OF SURGERY         Review of Systems   Constitutional: Negative for chills.   HENT: Negative for congestion.    Respiratory: Negative for cough.    Cardiovascular: Negative for chest pain.   Gastrointestinal: Negative for abdominal pain, constipation, diarrhea and hematochezia.  "  Genitourinary: Negative for hematuria.          OBJECTIVE:   /80   Pulse 67   Temp 97.7  F (36.5  C) (Temporal)   Resp 14   Ht 1.498 m (4' 10.98\")   Wt 64 kg (141 lb 3.2 oz)   SpO2 100%   BMI 28.54 kg/m    Physical Exam  GENERAL: healthy, alert and no distress  EYES: Eyes grossly normal to inspection, PERRL and conjunctivae and sclerae normal  HENT: ear canals and TM's normal, nose and mouth without ulcers or lesions  NECK: no adenopathy, no asymmetry, masses, or scars and thyroid normal to palpation  RESP: lungs clear to auscultation - no rales, rhonchi or wheezes  BREAST: normal without masses, tenderness or nipple discharge and no palpable axillary masses or adenopathy  CV: regular rate and rhythm, normal S1 S2, no S3 or S4, no murmur, click or rub, no peripheral edema and peripheral pulses strong  ABDOMEN: soft, nontender, no hepatosplenomegaly, no masses and bowel sounds normal  MS: no gross musculoskeletal defects noted, no edema  SKIN: no suspicious lesions or rashes  NEURO: Normal strength and tone, mentation intact and speech normal  PSYCH: mentation appears normal, affect normal/bright        ASSESSMENT/PLAN:   1. Routine general medical examination at a health care facility  See Counseling    2. Menorrhagia with regular cycle  Discussed with patient, pelvic ultrasound and possible IUD placement.    3. Page-menopause  See counseling    4. Prediabetes  lab results and schedule of future lab studies reviewed with patient, repeat labs ordered prior to next appointment, orders and follow up as documented in [facility], reviewed diet, exercise and weight control, cardiovascular risk and specific lipid/LDL goals reviewed.    -A1C FUTURE 6mo; Future    5. Urticaria  - ALLERGY/ASTHMA ADULT REFERRAL; Future  - Allergy adult food panel    6. Need for hepatitis C screening test  - Hepatitis C Screen Reflex to HCV RNA Quant and Genotype    7. Encounter for screening mammogram for breast cancer  - MA " "SCREENING DIGITAL BILAT - Future  (s+30); Future    8. Screening for endocrine, metabolic and immunity disorder  - Comprehensive metabolic panel  - TSH with free T4 reflex  - Hemoglobin A1c    Patient has been advised of split billing requirements and indicates understanding: Yes  COUNSELING:  Reviewed preventive health counseling, as reflected in patient instructions       Regular exercise       Healthy diet/nutrition       Vision screening       Hearing screening       Contraception       Osteoporosis prevention/bone health       Consider Hep C screening for all patients one time for ages 18-79 years       (Page)menopause management       Advance Care Planning    Estimated body mass index is 28.54 kg/m  as calculated from the following:    Height as of this encounter: 1.498 m (4' 10.98\").    Weight as of this encounter: 64 kg (141 lb 3.2 oz).    Weight management plan: Discussed healthy diet and exercise guidelines    She reports that she has never smoked. She has never used smokeless tobacco.      Counseling Resources:  ATP IV Guidelines  Pooled Cohorts Equation Calculator  Breast Cancer Risk Calculator  BRCA-Related Cancer Risk Assessment: FHS-7 Tool  FRAX Risk Assessment  ICSI Preventive Guidelines  Dietary Guidelines for Americans, 2010  USDA's MyPlate  ASA Prophylaxis  Lung CA Screening    Deisy Coyne MD  [facility]  "

## 2021-05-07 ENCOUNTER — OFFICE VISIT (OUTPATIENT)
Dept: FAMILY MEDICINE | Facility: CLINIC | Age: 46
End: 2021-05-07
Payer: COMMERCIAL

## 2021-05-07 VITALS
TEMPERATURE: 97.7 F | WEIGHT: 141.2 LBS | BODY MASS INDEX: 28.47 KG/M2 | HEART RATE: 67 BPM | HEIGHT: 59 IN | OXYGEN SATURATION: 100 % | SYSTOLIC BLOOD PRESSURE: 118 MMHG | RESPIRATION RATE: 14 BRPM | DIASTOLIC BLOOD PRESSURE: 80 MMHG

## 2021-05-07 DIAGNOSIS — Z13.29 SCREENING FOR ENDOCRINE, METABOLIC AND IMMUNITY DISORDER: ICD-10-CM

## 2021-05-07 DIAGNOSIS — N92.0 MENORRHAGIA WITH REGULAR CYCLE: ICD-10-CM

## 2021-05-07 DIAGNOSIS — Z13.228 SCREENING FOR ENDOCRINE, METABOLIC AND IMMUNITY DISORDER: ICD-10-CM

## 2021-05-07 DIAGNOSIS — R73.03 PREDIABETES: ICD-10-CM

## 2021-05-07 DIAGNOSIS — Z12.31 ENCOUNTER FOR SCREENING MAMMOGRAM FOR BREAST CANCER: ICD-10-CM

## 2021-05-07 DIAGNOSIS — L50.9 URTICARIA: ICD-10-CM

## 2021-05-07 DIAGNOSIS — Z00.00 ROUTINE GENERAL MEDICAL EXAMINATION AT A HEALTH CARE FACILITY: Primary | ICD-10-CM

## 2021-05-07 DIAGNOSIS — Z11.59 NEED FOR HEPATITIS C SCREENING TEST: ICD-10-CM

## 2021-05-07 DIAGNOSIS — Z13.0 SCREENING FOR ENDOCRINE, METABOLIC AND IMMUNITY DISORDER: ICD-10-CM

## 2021-05-07 DIAGNOSIS — N95.1 PERI-MENOPAUSE: ICD-10-CM

## 2021-05-07 LAB
ALBUMIN SERPL-MCNC: 4.1 G/DL (ref 3.4–5)
ALP SERPL-CCNC: 63 U/L (ref 40–150)
ALT SERPL W P-5'-P-CCNC: 20 U/L (ref 0–50)
ANION GAP SERPL CALCULATED.3IONS-SCNC: 6 MMOL/L (ref 3–14)
AST SERPL W P-5'-P-CCNC: 6 U/L (ref 0–45)
BILIRUB SERPL-MCNC: 1.1 MG/DL (ref 0.2–1.3)
BUN SERPL-MCNC: 19 MG/DL (ref 7–30)
CALCIUM SERPL-MCNC: 8.4 MG/DL (ref 8.5–10.1)
CHLORIDE SERPL-SCNC: 105 MMOL/L (ref 94–109)
CHOLEST SERPL-MCNC: 179 MG/DL
CO2 SERPL-SCNC: 25 MMOL/L (ref 20–32)
CREAT SERPL-MCNC: 0.74 MG/DL (ref 0.52–1.04)
FSH SERPL-ACNC: 2.7 IU/L
GFR SERPL CREATININE-BSD FRML MDRD: >90 ML/MIN/{1.73_M2}
GLUCOSE SERPL-MCNC: 89 MG/DL (ref 70–99)
HBA1C MFR BLD: 6 % (ref 0–5.6)
HCV AB SERPL QL IA: NONREACTIVE
HDLC SERPL-MCNC: 53 MG/DL
LDLC SERPL CALC-MCNC: 115 MG/DL
NONHDLC SERPL-MCNC: 126 MG/DL
POTASSIUM SERPL-SCNC: 4.1 MMOL/L (ref 3.4–5.3)
PROT SERPL-MCNC: 7.8 G/DL (ref 6.8–8.8)
SODIUM SERPL-SCNC: 136 MMOL/L (ref 133–144)
TRIGL SERPL-MCNC: 57 MG/DL
TSH SERPL DL<=0.005 MIU/L-ACNC: 1.44 MU/L (ref 0.4–4)

## 2021-05-07 PROCEDURE — 80053 COMPREHEN METABOLIC PANEL: CPT | Performed by: FAMILY MEDICINE

## 2021-05-07 PROCEDURE — 86803 HEPATITIS C AB TEST: CPT | Performed by: FAMILY MEDICINE

## 2021-05-07 PROCEDURE — 36415 COLL VENOUS BLD VENIPUNCTURE: CPT | Performed by: FAMILY MEDICINE

## 2021-05-07 PROCEDURE — 86003 ALLG SPEC IGE CRUDE XTRC EA: CPT | Performed by: FAMILY MEDICINE

## 2021-05-07 PROCEDURE — 82785 ASSAY OF IGE: CPT | Performed by: FAMILY MEDICINE

## 2021-05-07 PROCEDURE — 83036 HEMOGLOBIN GLYCOSYLATED A1C: CPT | Performed by: FAMILY MEDICINE

## 2021-05-07 PROCEDURE — 83001 ASSAY OF GONADOTROPIN (FSH): CPT | Performed by: FAMILY MEDICINE

## 2021-05-07 PROCEDURE — 80061 LIPID PANEL: CPT | Performed by: FAMILY MEDICINE

## 2021-05-07 PROCEDURE — 99214 OFFICE O/P EST MOD 30 MIN: CPT | Mod: 25 | Performed by: FAMILY MEDICINE

## 2021-05-07 PROCEDURE — 84443 ASSAY THYROID STIM HORMONE: CPT | Performed by: FAMILY MEDICINE

## 2021-05-07 PROCEDURE — 99396 PREV VISIT EST AGE 40-64: CPT | Performed by: FAMILY MEDICINE

## 2021-05-07 ASSESSMENT — ENCOUNTER SYMPTOMS
ABDOMINAL PAIN: 0
CONSTIPATION: 0
CHILLS: 0
HEMATURIA: 0
COUGH: 0
DIARRHEA: 0
HEMATOCHEZIA: 0

## 2021-05-07 ASSESSMENT — MIFFLIN-ST. JEOR: SCORE: 1185.72

## 2021-05-10 LAB
ALMOND IGE QN: <0.1 KU(A)/L
CASHEW NUT IGE QN: <0.1 KU(A)/L
CODFISH IGE QN: <0.1 KU(A)/L
COW MILK IGE QN: <0.1 KU(A)/L
EGG WHITE IGE QN: <0.1 KU(A)/L
HAZELNUT IGE QN: <0.1 KU(A)/L
IGE SERPL-ACNC: 19 KIU/L (ref 0–114)
PEANUT IGE QN: <0.1 KU(A)/L
SALMON IGE QN: <0.1 KU(A)/L
SCALLOP IGE QN: <0.1 KU(A)/L
SESAME SEED IGE QN: <0.1 KU(A)/L
SHRIMP IGE QN: <0.1 KU(A)/L
SOYBEAN IGE QN: <0.1 KU(A)/L
TUNA IGE QN: <0.1 KU(A)/L
WALNUT IGE QN: <0.1 KU(A)/L
WHEAT IGE QN: <0.1 KU(A)/L

## 2021-05-13 ENCOUNTER — ANCILLARY PROCEDURE (OUTPATIENT)
Dept: MAMMOGRAPHY | Facility: CLINIC | Age: 46
End: 2021-05-13
Attending: FAMILY MEDICINE
Payer: COMMERCIAL

## 2021-05-13 DIAGNOSIS — Z12.31 ENCOUNTER FOR SCREENING MAMMOGRAM FOR BREAST CANCER: ICD-10-CM

## 2021-05-13 PROCEDURE — 77067 SCR MAMMO BI INCL CAD: CPT | Mod: GC | Performed by: RADIOLOGY

## 2021-05-14 ENCOUNTER — OFFICE VISIT (OUTPATIENT)
Dept: OPTOMETRY | Facility: CLINIC | Age: 46
End: 2021-05-14
Payer: COMMERCIAL

## 2021-05-14 DIAGNOSIS — R73.03 PREDIABETES: Primary | ICD-10-CM

## 2021-05-14 DIAGNOSIS — H52.4 PRESBYOPIA: ICD-10-CM

## 2021-05-14 PROCEDURE — 92015 DETERMINE REFRACTIVE STATE: CPT | Performed by: OPTOMETRIST

## 2021-05-14 PROCEDURE — 92004 COMPRE OPH EXAM NEW PT 1/>: CPT | Performed by: OPTOMETRIST

## 2021-05-14 ASSESSMENT — CONF VISUAL FIELD
OS_NORMAL: 1
METHOD: COUNTING FINGERS
OD_NORMAL: 1

## 2021-05-14 ASSESSMENT — REFRACTION_MANIFEST
OD_SPHERE: PLANO
OS_ADD: +1.50
OD_CYLINDER: SPHERE
OD_ADD: +1.50
OS_CYLINDER: SPHERE
OS_SPHERE: PLANO

## 2021-05-14 ASSESSMENT — EXTERNAL EXAM - LEFT EYE: OS_EXAM: NORMAL

## 2021-05-14 ASSESSMENT — EXTERNAL EXAM - RIGHT EYE: OD_EXAM: NORMAL

## 2021-05-14 ASSESSMENT — VISUAL ACUITY
OS_SC: 20/20
METHOD: SNELLEN - LINEAR
OD_SC+: -1
OD_SC: 20/20

## 2021-05-14 ASSESSMENT — SLIT LAMP EXAM - LIDS
COMMENTS: NORMAL
COMMENTS: NORMAL

## 2021-05-14 ASSESSMENT — TONOMETRY
OD_IOP_MMHG: 14
IOP_METHOD: ICARE
OS_IOP_MMHG: 15

## 2021-05-14 ASSESSMENT — CUP TO DISC RATIO
OD_RATIO: 0.25
OS_RATIO: 0.25

## 2021-05-14 NOTE — NURSING NOTE
Chief Complaints and History of Present Illnesses   Patient presents with     Annual Eye Exam       Chief Complaint(s) and History of Present Illness(es)     Annual Eye Exam     Laterality: both eyes    Quality: blurred vision              Comments     Patient here for eye exam, last exam 5 years ago. Notes blurred vision at near, has been using OTC readers.   Patient is borderline diabetic. Has heard from doctors on YouTube that maybe being pre diabetic is causing her troubles with her vision.    Lab Results       Component                Value               Date                       A1C                      6.0                 05/07/2021                 A1C                      5.7                 08/21/2018                            Doris Yepez, COA

## 2021-05-14 NOTE — PROGRESS NOTES
Assessment/Plan  (R73.03) Prediabetes  (primary encounter diagnosis)  Comment: No retinopathy present  Plan: Discussed findings with patient. Encouraged continued blood glucose, pressure, and lipid control. Patient should continue following recommendations of primary care provider. Patient should plan on returning to clinic annually for a dilated eye exam but was encouraged to return to clinic sooner with new flashes/floaters or other vision changes.     (H52.4) Presbyopia  Comment: With emmetropia  Plan: REFRACTION [10514]        OTC +1.50 reading specs should work well for patient. Return to clinic with continued blurred vision. Advised patient that exercising eyes will not likely significantly improve her near focusing abilities due to physiologic changes of presbyopia.         Complete documentation of historical and exam elements from today's encounter can  be found in the full encounter summary report (not reduplicated in this progress  note). I personally obtained the chief complaint(s) and history of present illness. I  confirmed and edited as necessary the review of systems, past medical/surgical  history, family history, social history, and examination findings as documented by  others; and I examined the patient myself. I personally reviewed the relevant tests,  images, and reports as documented above. I formulated and edited as necessary the  assessment and plan and discussed the findings and management plan with the  patient and family.    Ian May OD

## 2021-05-17 PROBLEM — R73.03 PREDIABETES: Status: ACTIVE | Noted: 2021-05-17

## 2021-06-03 ENCOUNTER — ANCILLARY PROCEDURE (OUTPATIENT)
Dept: ULTRASOUND IMAGING | Facility: CLINIC | Age: 46
End: 2021-06-03
Attending: FAMILY MEDICINE
Payer: COMMERCIAL

## 2021-06-03 DIAGNOSIS — N92.0 MENORRHAGIA WITH REGULAR CYCLE: ICD-10-CM

## 2021-06-03 PROCEDURE — 76830 TRANSVAGINAL US NON-OB: CPT | Mod: GC | Performed by: RADIOLOGY

## 2021-06-03 PROCEDURE — 76856 US EXAM PELVIC COMPLETE: CPT | Mod: GC | Performed by: RADIOLOGY

## 2021-06-22 ENCOUNTER — OFFICE VISIT (OUTPATIENT)
Dept: OBGYN | Facility: CLINIC | Age: 46
End: 2021-06-22
Attending: FAMILY MEDICINE
Payer: COMMERCIAL

## 2021-06-22 VITALS
SYSTOLIC BLOOD PRESSURE: 117 MMHG | HEART RATE: 69 BPM | DIASTOLIC BLOOD PRESSURE: 74 MMHG | BODY MASS INDEX: 27.69 KG/M2 | OXYGEN SATURATION: 100 % | WEIGHT: 137 LBS

## 2021-06-22 DIAGNOSIS — N89.8 VAGINAL DISCHARGE: ICD-10-CM

## 2021-06-22 DIAGNOSIS — N92.1 MENOMETRORRHAGIA: Primary | ICD-10-CM

## 2021-06-22 LAB
HCG UR QL: NEGATIVE
SPECIMEN SOURCE: NORMAL
WET PREP SPEC: NORMAL

## 2021-06-22 PROCEDURE — 81025 URINE PREGNANCY TEST: CPT | Performed by: OBSTETRICS & GYNECOLOGY

## 2021-06-22 PROCEDURE — 58100 BIOPSY OF UTERUS LINING: CPT | Performed by: OBSTETRICS & GYNECOLOGY

## 2021-06-22 PROCEDURE — 99204 OFFICE O/P NEW MOD 45 MIN: CPT | Mod: 25 | Performed by: OBSTETRICS & GYNECOLOGY

## 2021-06-22 PROCEDURE — 87210 SMEAR WET MOUNT SALINE/INK: CPT | Performed by: OBSTETRICS & GYNECOLOGY

## 2021-06-22 PROCEDURE — 88305 TISSUE EXAM BY PATHOLOGIST: CPT | Performed by: PATHOLOGY

## 2021-06-22 NOTE — PROGRESS NOTES
Subjective  45 year old non-pregnant female presents today complaining of a change in her menses.  Patient states the first day of her menses is very strange.  She passes large clots when she uses the bathroom but that is really it.  No heavy and almost no bleeding the rest of the time except for when she uses the bathroom. She only has to use a pantyliner. She notices the blood to be brownish color.  No dysmenorrhea.  No problems urinating.  Normal bowel movements.  Patient has noticed some vaginal burning.  Slight vaginal discharge.  No odor.  Patient is sexually active.  No dyspareunia.  No vaginal spotting.  2 NSVDs.  Paternal mother with uterine cancer-80's.  No tobacco abuse.  We reviewed her ultrasound results in detail.  I reviewed the notes and labs from Dr. Coyne.  An FSH was normal.  We discussed the need for an endometrial biopsy and patient is wanting this done today.  She is not using anything for birth control so will do a UPT first.        I personally reviewed the ultrasound and the findings showed bilateral ovarian follicles.    I also reviewed notes and labs from previous office visits by Dr. Coyne.    ROS: 10 point ROS neg other than the symptoms noted above in the HPI.  Past Medical History:   Diagnosis Date     Cholelithiases      Plantar warts 2011     Past Surgical History:   Procedure Laterality Date     LAPAROSCOPIC CHOLECYSTECTOMY N/A 12/15/2015    Procedure: LAPAROSCOPIC CHOLECYSTECTOMY;  Surgeon: Chasity Young MD;  Location: [facility] OR     NO HISTORY OF SURGERY       Family History   Problem Relation Age of Onset     C.A.D. Mother      Heart Disease Mother      Lipids Mother      Thyroid Disease Mother      Diabetes No family hx of      Asthma No family hx of      Hypertension No family hx of      Cerebrovascular Disease No family hx of      Breast Cancer No family hx of      Cancer - colorectal No family hx of      Prostate Cancer No family hx of      Coronary Artery Disease No  family hx of      Colon Cancer No family hx of      Hyperlipidemia No family hx of      Other Cancer No family hx of      Depression No family hx of      Anxiety Disorder No family hx of      Mental Illness No family hx of      Substance Abuse No family hx of      Anesthesia Reaction No family hx of      Osteoporosis No family hx of      Genetic Disorder No family hx of      Social History     Tobacco Use     Smoking status: Never Smoker     Smokeless tobacco: Never Used   Substance Use Topics     Alcohol use: No         Objective  Vitals: /74 (BP Location: Right arm, Cuff Size: Adult Regular)   Pulse 69   Wt 62.1 kg (137 lb)   LMP 06/09/2021   SpO2 100%   Breastfeeding No   BMI 27.69 kg/m    BMI= Body mass index is 27.69 kg/m .    General appearance=well developed, well-nourished female  Gait=normal  Psych=mood is stable, alert and oriented x3  Abd=soft, Nontender/nondistended, no masses, no signs of hernias, no evidence of hepatosplenomegaly  PELVIC:    External genitalia: normal without lesions or masses  Urethral meatus: no lesions or prolapse noted, normal size  Urethra: no masses, non tender  Bladder: non tender, no fullness  Vagina: normal mucosa and rugae, no discharge, wet prep obtained.  Cervix: normal without lesion, no cervical motion tenderness, healthy, multiparous  Uterus: small, mobile, nontender.  Adnexa: non tender, without masses  Rectal: deffered  Ext=no clubbing or cyanosis, no swelling      Pelvic ultrasound=6/3/2021:  FINDINGS:  The uterus measures 4.0 cm x 7.5 cm x 5.3 cm, and there is no evidence  of a focal fibroid.  The endometrium is within normal limits and  measures 9 mm. Small amount of free fluid in the pelvis.     The right ovary measures 1.9 cm x 2.3 cm x 1.9 cm and the left ovary  measures 1.7 cm x 3.0 cm x 2.5 cm. There is no adnexal mass. There is  normal blood flow to the ovaries. There are scattered subcentimeter  follicles bilaterally.                                                                       IMPRESSION: Normal appearance of the uterus and ovaries. Subcentimeter  bilateral ovarian follicles, these could be physiologic.      Procedure:  Endometrial biopsy    Indication: Metrorrhagia with age >35                      Discussed risk of bleeding, infection, uterine perforation, cramping pain.  Pt agreed to proceed with procedure after all questions answered.    Speculum placed and cervix visualized.  Cervix cleansed with betadine x 3.  Endometrial biopsy pipelle passed through cervix and uterus sounded to 7.5 cm.  Biopsy specimen collected with one pass with return of moderate amount of pink tissue.  Specimen placed in a labeled container and set aside to be sent to pathology.  No bleeding noted from cervical os.     Patient tolerated the procedure well.  There were no apparent complications and bleeding was minimal.    She is instructed to use no tampons and have no intercourse for the next 5 days.      Assessment  1.)  Menometrorrhagia  2.)  Vaginal discharge       Plan  1.)  Endometrial biopsy today=we discussed the need to rule out hyperplasia or malignancy  2.)  Wet prep=r/o yeast or bacterial vaginosis   3.)  Follow-up to discuss treatment options=we briefly discussed medical and surgical options      35 minutes were spent on the date of the encounter doing chart review, history and exam, documentation, and further activities as noted above.  This time does not include time spent on the procedure.        Nursing notes read and reviewed    Ally Washington DO

## 2021-06-22 NOTE — PATIENT INSTRUCTIONS
Please call if you any questions.    [facility]  [address] [place]   [address]  [phone]        Ally Washington,

## 2021-06-24 LAB — COPATH REPORT: NORMAL

## 2021-07-20 ENCOUNTER — OFFICE VISIT (OUTPATIENT)
Dept: OBGYN | Facility: CLINIC | Age: 46
End: 2021-07-20
Payer: COMMERCIAL

## 2021-07-20 VITALS
WEIGHT: 133 LBS | SYSTOLIC BLOOD PRESSURE: 101 MMHG | DIASTOLIC BLOOD PRESSURE: 66 MMHG | BODY MASS INDEX: 26.88 KG/M2 | OXYGEN SATURATION: 100 % | HEART RATE: 74 BPM

## 2021-07-20 DIAGNOSIS — N92.1 MENOMETRORRHAGIA: Primary | ICD-10-CM

## 2021-07-20 PROCEDURE — 99214 OFFICE O/P EST MOD 30 MIN: CPT | Performed by: OBSTETRICS & GYNECOLOGY

## 2021-07-20 NOTE — PATIENT INSTRUCTIONS
Please call if you any questions.    [facility]  [address] [place]   [address]  [phone]        Ally Washington,

## 2021-07-20 NOTE — PROGRESS NOTES
Subjective  45 year old non-pregnant female presents today to discuss her test results.  I saw patient in June complaining of a changes in her menses.  Patient states the first day of her menses is very strange.  She passes large clots when she uses the bathroom but that is really it.  No heavy and almost no bleeding the rest of the time except for when she uses the bathroom. She only has to use a pantyliner. She notices the blood to be brownish color.  No dysmenorrhea.  No problems urinating.  Normal bowel movements.  Patient has noticed some vaginal burning.  Slight vaginal discharge.  No odor.  Patient is sexually active.  No dyspareunia.  No vaginal spotting.  2 NSVDs.  Paternal mother with uterine cancer-80's.  No tobacco abuse.  We reviewed her ultrasound results in detail.  We also reviewed her lab and pathology results.  We discussed treatment options for her heavy menses.  We discussed medical vs surgical options.  She is not interested in any treatment at this time.      I personally reviewed the ultrasound and the findings were benign.    ROS: 10 point ROS neg other than the symptoms noted above in the HPI.  Past Medical History:   Diagnosis Date     Cholelithiases      Plantar warts 2011     Past Surgical History:   Procedure Laterality Date     LAPAROSCOPIC CHOLECYSTECTOMY N/A 12/15/2015    Procedure: LAPAROSCOPIC CHOLECYSTECTOMY;  Surgeon: Chasity Young MD;  Location: [facility] OR     NO HISTORY OF SURGERY       Family History   Problem Relation Age of Onset     C.A.D. Mother      Heart Disease Mother      Lipids Mother      Thyroid Disease Mother      Diabetes No family hx of      Asthma No family hx of      Hypertension No family hx of      Cerebrovascular Disease No family hx of      Breast Cancer No family hx of      Cancer - colorectal No family hx of      Prostate Cancer No family hx of      Coronary Artery Disease No family hx of      Colon Cancer No family hx of      Hyperlipidemia No family  hx of      Other Cancer No family hx of      Depression No family hx of      Anxiety Disorder No family hx of      Mental Illness No family hx of      Substance Abuse No family hx of      Anesthesia Reaction No family hx of      Osteoporosis No family hx of      Genetic Disorder No family hx of      Social History     Tobacco Use     Smoking status: Never Smoker     Smokeless tobacco: Never Used   Substance Use Topics     Alcohol use: No         Objective  Vitals: /66 (BP Location: Right arm, Cuff Size: Adult Regular)   Pulse 74   Wt 60.3 kg (133 lb)   SpO2 100%   Breastfeeding No   BMI 26.88 kg/m    BMI= Body mass index is 26.88 kg/m .    General appearance=well developed, well-nourished female  Gait=normal  Psych=mood is stable, alert and oriented x3      Pelvic ultrasound=6/3/2021:  FINDINGS:  The uterus measures 4.0 cm x 7.5 cm x 5.3 cm, and there is no evidence  of a focal fibroid.  The endometrium is within normal limits and  measures 9 mm. Small amount of free fluid in the pelvis.     The right ovary measures 1.9 cm x 2.3 cm x 1.9 cm and the left ovary  measures 1.7 cm x 3.0 cm x 2.5 cm. There is no adnexal mass. There is  normal blood flow to the ovaries. There are scattered subcentimeter  follicles bilaterally.                                                                      IMPRESSION: Normal appearance of the uterus and ovaries. Subcentimeter  bilateral ovarian follicles, these could be physiologic.       Endometrial biopsy=6/22/2021:  SPECIMEN(S):   Endometrial biopsy     FINAL DIAGNOSIS:   ENDOMETRIUM, BIOPSY:   - Proliferative-type endometrium   - Negative for atypia and malignancy      Assessment  1.)  Menometrorrhagia      Plan  1.)  Follow-up if/when ready for treatment options=we discussed medical and surgical options.  We discussed the different types of hormones and have long someone can be on these.  We discussed the IUD as well as surgical options.        30 minutes were spent on  the date of the encounter doing chart review, history and exam, documentation, and further activities as noted above.      Nursing notes read and reviewed    Ally Washington DO

## 2021-09-01 ENCOUNTER — TELEPHONE (OUTPATIENT)
Dept: FAMILY MEDICINE | Facility: CLINIC | Age: 46
End: 2021-09-01

## 2021-09-01 NOTE — TELEPHONE ENCOUNTER
Left voicemail and sent Mychart to reschedule pt. Please help patient reschedule the canceled appt below:       - Provider:  [facility]           - Canceled appt details         Date: 10/5/2021          Time:  9:00am         Type of visit:  physical      - Reason for change/cancellation: Provider had a schedule change.       Notes to consider when rescheduling:         Please close encounter once the patient has been rescheduled

## 2021-10-02 ENCOUNTER — HEALTH MAINTENANCE LETTER (OUTPATIENT)
Age: 46
End: 2021-10-02

## 2021-11-09 ENCOUNTER — LAB (OUTPATIENT)
Dept: LAB | Facility: CLINIC | Age: 46
End: 2021-11-09
Payer: COMMERCIAL

## 2021-11-09 DIAGNOSIS — R73.03 PREDIABETES: ICD-10-CM

## 2021-11-09 LAB — HBA1C MFR BLD: 6 % (ref 0–5.6)

## 2021-11-09 PROCEDURE — 36415 COLL VENOUS BLD VENIPUNCTURE: CPT

## 2021-11-09 PROCEDURE — 83036 HEMOGLOBIN GLYCOSYLATED A1C: CPT

## 2022-07-09 ENCOUNTER — HEALTH MAINTENANCE LETTER (OUTPATIENT)
Age: 47
End: 2022-07-09

## 2022-09-03 ENCOUNTER — HEALTH MAINTENANCE LETTER (OUTPATIENT)
Age: 47
End: 2022-09-03

## 2023-07-22 ENCOUNTER — HEALTH MAINTENANCE LETTER (OUTPATIENT)
Age: 48
End: 2023-07-22

## 2023-09-30 ENCOUNTER — HEALTH MAINTENANCE LETTER (OUTPATIENT)
Age: 48
End: 2023-09-30

## 2023-10-31 NOTE — PROGRESS NOTES
Please mail result letter with the following comment:    Dear Yael,   Your recent lab results showed the following:  -- Holter monitor result didn't show any arrhythmia. During the time you had the monitor, no symptoms were reported. So we cannot say for certain what the palpitations are.   -- The good news is that your heart rhythm throughout the course is normal. If the palpitations becomes more frequent in the future, such as daily, we can consider re-do the monitor again.     Please call or Mychart to our office if you have further questions.     Diego Rouse MD-PhD Discharged

## 2023-11-21 NOTE — PROGRESS NOTES
SUBJECTIVE:   CC: Yael Morales is an 43 year old woman who presents for preventive health visit.     Healthy Habits:    Do you get at least three servings of calcium containing foods daily (dairy, green leafy vegetables, etc.)? yes    Amount of exercise or daily activities, outside of work: 0 day(s) per week    Problems taking medications regularly No    Medication side effects: No    Have you had an eye exam in the past two years? no    Do you see a dentist twice per year? yes    Do you have sleep apnea, excessive snoring or daytime drowsiness?no      PROBLEMS TO ADD ON... Discomfort in belly button area for past week     Patient tells me that she noticed some discomfort over the bellybutton in the last week.  It is intermittent.  Currently not present.  Last Saturday, he seems to be a little bit more uncomfortable than before.  No severe pain.  No nausea vomiting or any other GI symptoms.    She told me that when she had cholecystectomy in 2017, the surgeon told her that she had a umbilical hernia.  However when I review her surgeon's note, at the post of checkup, it was commented specifically that there was no evidence of umbilical hernia.    Today's PHQ-2 Score:   PHQ-2 ( 1999 [employer]) 5/23/2019 8/21/2018   Q1: Little interest or pleasure in doing things 0 0   Q2: Feeling down, depressed or hopeless 0 0   PHQ-2 Score 0 0       Abuse: Current or Past(Physical, Sexual or Emotional)- No  Do you feel safe in your environment? Yes        Social History     Tobacco Use     Smoking status: Never Smoker     Smokeless tobacco: Never Used   Substance Use Topics     Alcohol use: No     If you drink alcohol do you typically have >3 drinks per day or >7 drinks per week? No                     Reviewed orders with patient.  Reviewed health maintenance and updated orders accordingly - Yes  BP Readings from Last 3 Encounters:   12/03/19 113/70   05/23/19 110/74   08/21/18 110/74    Wt Readings from Last 3 Encounters:  "  12/03/19 64.4 kg (142 lb)   05/23/19 63.7 kg (140 lb 8 oz)   08/21/18 68 kg (150 lb)                    Mammogram Screening: Patient under age 50, mutual decision reflected in health maintenance.      Pertinent mammograms are reviewed under the imaging tab.  History of abnormal Pap smear: NO - age 30-65 PAP every 5 years with negative HPV co-testing recommended  PAP / HPV Latest Ref Rng & Units 8/21/2018 9/12/2014 1/31/2014   PAP - NIL NIL NIL   HPV 16 DNA NEG:Negative Negative - -   HPV 18 DNA NEG:Negative Negative - -   OTHER HR HPV NEG:Negative Negative - -     Reviewed and updated as needed this visit by clinical staff  Tobacco  Allergies  Meds  Med Hx  Surg Hx  Fam Hx  Soc Hx        Reviewed and updated as needed this visit by Provider            ROS:  CONSTITUTIONAL: NEGATIVE for fever, chills, change in weight  INTEGUMENTARU/SKIN: NEGATIVE for worrisome rashes, moles or lesions  EYES: NEGATIVE for vision changes or irritation  ENT: NEGATIVE for ear, mouth and throat problems  RESP: NEGATIVE for significant cough or SOB  BREAST: NEGATIVE for masses, tenderness or discharge  CV: NEGATIVE for chest pain, palpitations or peripheral edema  GI: NEGATIVE for nausea, abdominal pain, heartburn, or change in bowel habits  : NEGATIVE for unusual urinary or vaginal symptoms. Periods are regular.  MUSCULOSKELETAL: NEGATIVE for significant arthralgias or myalgia  NEURO: NEGATIVE for weakness, dizziness or paresthesias  PSYCHIATRIC: NEGATIVE for changes in mood or affect    OBJECTIVE:   /70   Pulse 95   Temp 97.2  F (36.2  C) (Temporal)   Ht 1.492 m (4' 10.75\")   Wt 64.4 kg (142 lb)   SpO2 99%   BMI 28.93 kg/m    EXAM:  GENERAL: healthy, alert and no distress  EYES: Eyes grossly normal to inspection, PERRL and conjunctivae and sclerae normal  HENT: ear canals and TM's normal, nose and mouth without ulcers or lesions  NECK: no adenopathy, no asymmetry, masses, or scars and thyroid normal to " palpation  RESP: lungs clear to auscultation - no rales, rhonchi or wheezes  BREAST: normal without masses, tenderness or nipple discharge and no palpable axillary masses or adenopathy  CV: regular rate and rhythm, normal S1 S2, no S3 or S4, no murmur, click or rub, no peripheral edema and peripheral pulses strong  ABDOMEN: soft, nontender, no hepatosplenomegaly, no masses and bowel sounds normal  MS: no gross musculoskeletal defects noted, no edema  SKIN: no suspicious lesions or rashes  NEURO: Normal strength and tone, mentation intact and speech normal  PSYCH: mentation appears normal, affect normal/bright    Diagnostic Test Results:  Results for orders placed or performed in visit on 12/03/19 (from the past 24 hour(s))   Basic metabolic panel   Result Value Ref Range    Sodium 141 133 - 144 mmol/L    Potassium 4.0 3.4 - 5.3 mmol/L    Chloride 110 (H) 94 - 109 mmol/L    Carbon Dioxide 28 20 - 32 mmol/L    Anion Gap 3 3 - 14 mmol/L    Glucose 95 70 - 99 mg/dL    Urea Nitrogen 13 7 - 30 mg/dL    Creatinine 0.66 0.52 - 1.04 mg/dL    GFR Estimate >90 >60 mL/min/[1.73_m2]    GFR Estimate If Black >90 >60 mL/min/[1.73_m2]    Calcium 8.7 8.5 - 10.1 mg/dL   Lipid panel reflex to direct LDL Fasting   Result Value Ref Range    Cholesterol 179 <200 mg/dL    Triglycerides 89 <150 mg/dL    HDL Cholesterol 47 (L) >49 mg/dL    LDL Cholesterol Calculated 114 (H) <100 mg/dL    Non HDL Cholesterol 132 (H) <130 mg/dL       ASSESSMENT/PLAN:       ICD-10-CM    1. Routine general medical examination at a health care facility Z00.00 Basic metabolic panel     Lipid panel reflex to direct LDL Fasting   2. Flu vaccine need Z23    3. Encounter for screening mammogram for breast cancer Z12.31 MA Screening Digital Bilateral   4. Hyperlipidemia LDL goal <160 E78.5    5. Overweight (BMI 25.0-29.9) E66.3      --Borderline lipids: Patient will work on low-fat low-cholesterol diet and weight loss.  --Intermittent umbilical discomfort in the last  "week: Currently there is no pain.  No fascia defect detected at the umbilicus.  Monitor symptoms.  COUNSELING:   Reviewed preventive health counseling, as reflected in patient instructions    Estimated body mass index is 28.93 kg/m  as calculated from the following:    Height as of this encounter: 1.492 m (4' 10.75\").    Weight as of this encounter: 64.4 kg (142 lb).    Weight management plan: Has been working on weight loss.  Able to lose 10 pounds in the last year.  Gained a couple of pounds over the holidays.  He wants to continue to work on weight loss, wanted to get back to about 120 pounds.     reports that she has never smoked. She has never used smokeless tobacco.      Counseling Resources:  ATP IV Guidelines  Pooled Cohorts Equation Calculator  Breast Cancer Risk Calculator  FRAX Risk Assessment  ICSI Preventive Guidelines  Dietary Guidelines for Americans, 2010  USDA's MyPlate  ASA Prophylaxis  Lung CA Screening    Diego Rouse MD PhD  [facility]      " Silver Nitrate Text: The wound bed was treated with silver nitrate after the biopsy was performed.

## 2024-02-16 ENCOUNTER — OFFICE VISIT (OUTPATIENT)
Dept: OPHTHALMOLOGY | Facility: CLINIC | Age: 49
End: 2024-02-16
Payer: COMMERCIAL

## 2024-02-16 DIAGNOSIS — R73.03 PREDIABETES: Primary | ICD-10-CM

## 2024-02-16 DIAGNOSIS — H52.4 PRESBYOPIA: ICD-10-CM

## 2024-02-16 PROCEDURE — 92015 DETERMINE REFRACTIVE STATE: CPT | Performed by: OPTOMETRIST

## 2024-02-16 PROCEDURE — 92014 COMPRE OPH EXAM EST PT 1/>: CPT | Performed by: OPTOMETRIST

## 2024-02-16 ASSESSMENT — CUP TO DISC RATIO
OS_RATIO: 0.25
OD_RATIO: 0.25

## 2024-02-16 ASSESSMENT — CONF VISUAL FIELD
OD_INFERIOR_TEMPORAL_RESTRICTION: 0
METHOD: COUNTING FINGERS
OD_SUPERIOR_NASAL_RESTRICTION: 0
OS_NORMAL: 1
OS_SUPERIOR_NASAL_RESTRICTION: 0
OD_NORMAL: 1
OD_INFERIOR_NASAL_RESTRICTION: 0
OS_INFERIOR_NASAL_RESTRICTION: 0
OS_SUPERIOR_TEMPORAL_RESTRICTION: 0
OS_INFERIOR_TEMPORAL_RESTRICTION: 0
OD_SUPERIOR_TEMPORAL_RESTRICTION: 0

## 2024-02-16 ASSESSMENT — SLIT LAMP EXAM - LIDS
COMMENTS: NORMAL
COMMENTS: NORMAL

## 2024-02-16 ASSESSMENT — TONOMETRY
OD_IOP_MMHG: 17
OS_IOP_MMHG: 15
IOP_METHOD: ICARE

## 2024-02-16 ASSESSMENT — EXTERNAL EXAM - LEFT EYE: OS_EXAM: NORMAL

## 2024-02-16 ASSESSMENT — VISUAL ACUITY
OS_SC: 20/15
OS_SC+: -2
OD_SC+: -2
METHOD: SNELLEN - LINEAR
OD_SC: 20/15

## 2024-02-16 ASSESSMENT — REFRACTION_MANIFEST
OS_CYLINDER: +0.25
OD_CYLINDER: SPHERE
OS_AXIS: 040
OS_ADD: +2.00
OS_SPHERE: +1.00
OD_SPHERE: +0.75
OD_ADD: +2.00

## 2024-02-16 ASSESSMENT — EXTERNAL EXAM - RIGHT EYE: OD_EXAM: NORMAL

## 2024-02-16 NOTE — NURSING NOTE
Chief Complaints and History of Present Illnesses   Patient presents with    COMPREHENSIVE EYE EXAM     Chief Complaint(s) and History of Present Illness(es)       COMPREHENSIVE EYE EXAM              Laterality: both eyes    Context: near vision    Course: gradually worsening    Associated symptoms: Negative for eye pain, flashes and floaters    Treatments tried: no treatments              Comments    Yael Allred is a(n) 48 year old female who presents for a comprehensive exam. Last eye exam was 3 year(s) ago. Since exam, vision has decreased for near - wants a pair of glasses for all distances. No lubricating drops. No flashes and floaters. No eye pain.     Lab Results       Component                Value               Date                       A1C                      6.0                 11/09/2021                 A1C                      6.0                 05/07/2021                 A1C                      5.7                 08/21/2018              Scott Briones COT 12:53 PM February 16, 2024

## 2024-02-16 NOTE — PROGRESS NOTES
Assessment/Plan  (R73.03) Prediabetes  (primary encounter diagnosis)  Comment: No evidence of retinopathy.   Plan: Monitor regularly for changes. Continue management with primary care provider.     (H52.4) Presbyopia  Plan: REFRACTION [[id number]]        Discussed findings with patient. New spectacle prescription dispensed to patient. Patient is welcome to return to clinic with prolonged adaptation difficulties. Some adjustment period to progressive lens should be expected. Caution around stairs/curbs initially should be expected.       Complete documentation of historical and exam elements from today's encounter can  be found in the full encounter summary report (not reduplicated in this progress  note). I personally obtained the chief complaint(s) and history of present illness. I  confirmed and edited as necessary the review of systems, past medical/surgical  history, family history, social history, and examination findings as documented by  others; and I examined the patient myself. I personally reviewed the relevant tests,  images, and reports as documented above. I formulated and edited as necessary the  assessment and plan and discussed the findings and management plan with the  patient and family.    Ian May OD

## 2024-09-14 ENCOUNTER — HEALTH MAINTENANCE LETTER (OUTPATIENT)
Age: 49
End: 2024-09-14

## 2024-11-23 ENCOUNTER — HEALTH MAINTENANCE LETTER (OUTPATIENT)
Age: 49
End: 2024-11-23

## 2025-09-01 ENCOUNTER — PATIENT OUTREACH (OUTPATIENT)
Dept: CARE COORDINATION | Facility: CLINIC | Age: 50
End: 2025-09-01
Payer: COMMERCIAL

## 2025-09-03 ENCOUNTER — PATIENT OUTREACH (OUTPATIENT)
Dept: CARE COORDINATION | Facility: CLINIC | Age: 50
End: 2025-09-03
Payer: COMMERCIAL